# Patient Record
Sex: MALE | Race: WHITE | Employment: OTHER | URBAN - METROPOLITAN AREA
[De-identification: names, ages, dates, MRNs, and addresses within clinical notes are randomized per-mention and may not be internally consistent; named-entity substitution may affect disease eponyms.]

---

## 2023-10-02 ENCOUNTER — TELEPHONE (OUTPATIENT)
Dept: PRIMARY CARE | Facility: CLINIC | Age: 88
End: 2023-10-02
Payer: COMMERCIAL

## 2023-10-02 DIAGNOSIS — M62.81 MUSCLE WEAKNESS: Primary | ICD-10-CM

## 2023-10-02 NOTE — TELEPHONE ENCOUNTER
[facility] [phone]  PHONE NUMBER AND THE FAX NUMBER IS GOING TO [phone] IS CALLING TO SEE IF YOU CAN PUT IN AN ORDER FOR PATIENT TO GET PHYSICAL THERAPY  AND OCCUPATIONAL THERAPY DUE TO A RECENT FALL AND GENERALIZED WEAKNESS.

## 2023-10-03 NOTE — TELEPHONE ENCOUNTER
I TALKED TO ALETHA AND SHE STATED THAT SHE DID NEED THE REFERRAL SO IT WAS SENT TO HER [phone]. THANK YOU.

## 2023-10-30 DIAGNOSIS — I10 HYPERTENSION, UNSPECIFIED TYPE: Primary | ICD-10-CM

## 2023-10-30 RX ORDER — METOPROLOL SUCCINATE 25 MG/1
25 TABLET, EXTENDED RELEASE ORAL DAILY
Qty: 90 TABLET | Refills: 3 | Status: SHIPPED | OUTPATIENT
Start: 2023-10-30

## 2024-03-06 DIAGNOSIS — E03.9 HYPOTHYROIDISM, UNSPECIFIED TYPE: Primary | ICD-10-CM

## 2024-03-06 RX ORDER — LEVOTHYROXINE SODIUM 100 UG/1
100 TABLET ORAL DAILY
Qty: 90 TABLET | Refills: 3 | Status: SHIPPED | OUTPATIENT
Start: 2024-03-06

## 2024-03-11 DIAGNOSIS — N40.0 BENIGN PROSTATIC HYPERPLASIA, UNSPECIFIED WHETHER LOWER URINARY TRACT SYMPTOMS PRESENT: Primary | ICD-10-CM

## 2024-03-11 RX ORDER — FINASTERIDE 5 MG/1
5 TABLET, FILM COATED ORAL DAILY
Qty: 90 TABLET | Refills: 3 | Status: SHIPPED | OUTPATIENT
Start: 2024-03-11

## 2024-03-22 ENCOUNTER — LAB (OUTPATIENT)
Dept: LAB | Facility: LAB | Age: 89
End: 2024-03-22
Payer: COMMERCIAL

## 2024-03-22 ENCOUNTER — OFFICE VISIT (OUTPATIENT)
Dept: PRIMARY CARE | Facility: CLINIC | Age: 89
End: 2024-03-22
Payer: COMMERCIAL

## 2024-03-22 VITALS
HEIGHT: 67 IN | SYSTOLIC BLOOD PRESSURE: 108 MMHG | WEIGHT: 166 LBS | BODY MASS INDEX: 26.06 KG/M2 | DIASTOLIC BLOOD PRESSURE: 65 MMHG | HEART RATE: 70 BPM | RESPIRATION RATE: 16 BRPM

## 2024-03-22 DIAGNOSIS — G62.9 POLYNEUROPATHY: ICD-10-CM

## 2024-03-22 DIAGNOSIS — N40.1 BPH WITH OBSTRUCTION/LOWER URINARY TRACT SYMPTOMS: ICD-10-CM

## 2024-03-22 DIAGNOSIS — M19.90 OSTEOARTHRITIS, UNSPECIFIED OSTEOARTHRITIS TYPE, UNSPECIFIED SITE: ICD-10-CM

## 2024-03-22 DIAGNOSIS — C44.90 SKIN CANCER: ICD-10-CM

## 2024-03-22 DIAGNOSIS — I48.21 PERMANENT ATRIAL FIBRILLATION (MULTI): ICD-10-CM

## 2024-03-22 DIAGNOSIS — I50.22 CHRONIC SYSTOLIC HEART FAILURE (MULTI): ICD-10-CM

## 2024-03-22 DIAGNOSIS — R42 DIZZINESS: ICD-10-CM

## 2024-03-22 DIAGNOSIS — I50.9 CONGESTIVE HEART FAILURE, UNSPECIFIED HF CHRONICITY, UNSPECIFIED HEART FAILURE TYPE (MULTI): ICD-10-CM

## 2024-03-22 DIAGNOSIS — R42 VERTIGO: ICD-10-CM

## 2024-03-22 DIAGNOSIS — I35.0 AORTIC VALVE STENOSIS, ETIOLOGY OF CARDIAC VALVE DISEASE UNSPECIFIED: ICD-10-CM

## 2024-03-22 DIAGNOSIS — N42.9 PROSTATE DISEASE: ICD-10-CM

## 2024-03-22 DIAGNOSIS — H93.13 TINNITUS OF BOTH EARS: ICD-10-CM

## 2024-03-22 DIAGNOSIS — R79.89 LOW VITAMIN B12 LEVEL: ICD-10-CM

## 2024-03-22 DIAGNOSIS — R06.09 EXERTIONAL DYSPNEA: ICD-10-CM

## 2024-03-22 DIAGNOSIS — I44.2 COMPLETE ATRIOVENTRICULAR BLOCK DUE TO ATRIOVENTRICULAR NODAL ABLATION (MULTI): ICD-10-CM

## 2024-03-22 DIAGNOSIS — E03.9 HYPOTHYROIDISM, UNSPECIFIED TYPE: ICD-10-CM

## 2024-03-22 DIAGNOSIS — I48.91 ATRIAL FIBRILLATION, UNSPECIFIED TYPE (MULTI): ICD-10-CM

## 2024-03-22 DIAGNOSIS — J43.9 PULMONARY EMPHYSEMA, UNSPECIFIED EMPHYSEMA TYPE (MULTI): ICD-10-CM

## 2024-03-22 DIAGNOSIS — Z66 DNR (DO NOT RESUSCITATE): ICD-10-CM

## 2024-03-22 DIAGNOSIS — I51.9 HEART DISEASE: ICD-10-CM

## 2024-03-22 DIAGNOSIS — N13.8 BPH WITH OBSTRUCTION/LOWER URINARY TRACT SYMPTOMS: ICD-10-CM

## 2024-03-22 DIAGNOSIS — M17.12 PRIMARY OSTEOARTHRITIS OF LEFT KNEE: ICD-10-CM

## 2024-03-22 DIAGNOSIS — I97.190 COMPLETE ATRIOVENTRICULAR BLOCK DUE TO ATRIOVENTRICULAR NODAL ABLATION (MULTI): ICD-10-CM

## 2024-03-22 DIAGNOSIS — E78.5 HYPERLIPIDEMIA, UNSPECIFIED HYPERLIPIDEMIA TYPE: ICD-10-CM

## 2024-03-22 DIAGNOSIS — I50.30: ICD-10-CM

## 2024-03-22 DIAGNOSIS — Z95.0 PRESENCE OF BIVENTRICULAR CARDIAC PACEMAKER: Chronic | ICD-10-CM

## 2024-03-22 DIAGNOSIS — I42.0 DILATED CARDIOMYOPATHY (MULTI): ICD-10-CM

## 2024-03-22 DIAGNOSIS — G45.9 TIA (TRANSIENT ISCHEMIC ATTACK): ICD-10-CM

## 2024-03-22 DIAGNOSIS — R33.9 INCOMPLETE BLADDER EMPTYING: ICD-10-CM

## 2024-03-22 DIAGNOSIS — R35.1 NOCTURIA: ICD-10-CM

## 2024-03-22 DIAGNOSIS — H90.3 SENSORINEURAL HEARING LOSS, BILATERAL: ICD-10-CM

## 2024-03-22 DIAGNOSIS — E87.1 HYPONATREMIA: ICD-10-CM

## 2024-03-22 DIAGNOSIS — G47.00 INSOMNIA, UNSPECIFIED TYPE: ICD-10-CM

## 2024-03-22 DIAGNOSIS — Z00.00 MEDICARE ANNUAL WELLNESS VISIT, SUBSEQUENT: Primary | ICD-10-CM

## 2024-03-22 PROBLEM — J44.9 CHRONIC OBSTRUCTIVE PULMONARY DISEASE (MULTI): Status: ACTIVE | Noted: 2022-07-25

## 2024-03-22 LAB
ALBUMIN SERPL BCP-MCNC: 3.6 G/DL (ref 3.4–5)
ALP SERPL-CCNC: 80 U/L (ref 33–136)
ALT SERPL W P-5'-P-CCNC: 12 U/L (ref 10–52)
ANION GAP SERPL CALC-SCNC: 9 MMOL/L (ref 10–20)
AST SERPL W P-5'-P-CCNC: 23 U/L (ref 9–39)
BASOPHILS # BLD AUTO: 0.02 X10*3/UL (ref 0–0.1)
BASOPHILS NFR BLD AUTO: 0.3 %
BILIRUB SERPL-MCNC: 0.7 MG/DL (ref 0–1.2)
BUN SERPL-MCNC: 32 MG/DL (ref 6–23)
CALCIUM SERPL-MCNC: 8.7 MG/DL (ref 8.6–10.3)
CHLORIDE SERPL-SCNC: 104 MMOL/L (ref 98–107)
CHOLEST SERPL-MCNC: 137 MG/DL (ref 0–199)
CHOLESTEROL/HDL RATIO: 2.6
CO2 SERPL-SCNC: 27 MMOL/L (ref 21–32)
CREAT SERPL-MCNC: 1.04 MG/DL (ref 0.5–1.3)
EGFRCR SERPLBLD CKD-EPI 2021: 65 ML/MIN/1.73M*2
EOSINOPHIL # BLD AUTO: 0.11 X10*3/UL (ref 0–0.4)
EOSINOPHIL NFR BLD AUTO: 1.4 %
ERYTHROCYTE [DISTWIDTH] IN BLOOD BY AUTOMATED COUNT: 14.2 % (ref 11.5–14.5)
EST. AVERAGE GLUCOSE BLD GHB EST-MCNC: 128 MG/DL
GLUCOSE SERPL-MCNC: 49 MG/DL (ref 74–99)
HBA1C MFR BLD: 6.1 %
HCT VFR BLD AUTO: 41.8 % (ref 41–52)
HDLC SERPL-MCNC: 52.1 MG/DL
HGB BLD-MCNC: 13.2 G/DL (ref 13.5–17.5)
IMM GRANULOCYTES # BLD AUTO: 0.03 X10*3/UL (ref 0–0.5)
IMM GRANULOCYTES NFR BLD AUTO: 0.4 % (ref 0–0.9)
LDLC SERPL CALC-MCNC: 69 MG/DL
LYMPHOCYTES # BLD AUTO: 2.93 X10*3/UL (ref 0.8–3)
LYMPHOCYTES NFR BLD AUTO: 37.6 %
MCH RBC QN AUTO: 32.2 PG (ref 26–34)
MCHC RBC AUTO-ENTMCNC: 31.6 G/DL (ref 32–36)
MCV RBC AUTO: 102 FL (ref 80–100)
MONOCYTES # BLD AUTO: 0.86 X10*3/UL (ref 0.05–0.8)
MONOCYTES NFR BLD AUTO: 11 %
NEUTROPHILS # BLD AUTO: 3.84 X10*3/UL (ref 1.6–5.5)
NEUTROPHILS NFR BLD AUTO: 49.3 %
NON HDL CHOLESTEROL: 85 MG/DL (ref 0–149)
NRBC BLD-RTO: 0 /100 WBCS (ref 0–0)
PLATELET # BLD AUTO: 183 X10*3/UL (ref 150–450)
POTASSIUM SERPL-SCNC: 4.2 MMOL/L (ref 3.5–5.3)
PROT SERPL-MCNC: 6.6 G/DL (ref 6.4–8.2)
RBC # BLD AUTO: 4.1 X10*6/UL (ref 4.5–5.9)
SODIUM SERPL-SCNC: 136 MMOL/L (ref 136–145)
TRIGL SERPL-MCNC: 81 MG/DL (ref 0–149)
TSH SERPL-ACNC: 0.69 MIU/L (ref 0.44–3.98)
VIT B12 SERPL-MCNC: 3222 PG/ML (ref 211–911)
VLDL: 16 MG/DL (ref 0–40)
WBC # BLD AUTO: 7.8 X10*3/UL (ref 4.4–11.3)

## 2024-03-22 PROCEDURE — 1036F TOBACCO NON-USER: CPT | Performed by: FAMILY MEDICINE

## 2024-03-22 PROCEDURE — G0439 PPPS, SUBSEQ VISIT: HCPCS | Performed by: FAMILY MEDICINE

## 2024-03-22 PROCEDURE — 80061 LIPID PANEL: CPT

## 2024-03-22 PROCEDURE — 1170F FXNL STATUS ASSESSED: CPT | Performed by: FAMILY MEDICINE

## 2024-03-22 PROCEDURE — 84443 ASSAY THYROID STIM HORMONE: CPT

## 2024-03-22 PROCEDURE — 99406 BEHAV CHNG SMOKING 3-10 MIN: CPT | Performed by: FAMILY MEDICINE

## 2024-03-22 PROCEDURE — 99213 OFFICE O/P EST LOW 20 MIN: CPT | Performed by: FAMILY MEDICINE

## 2024-03-22 PROCEDURE — 1159F MED LIST DOCD IN RCRD: CPT | Performed by: FAMILY MEDICINE

## 2024-03-22 PROCEDURE — 80053 COMPREHEN METABOLIC PANEL: CPT

## 2024-03-22 PROCEDURE — 82607 VITAMIN B-12: CPT

## 2024-03-22 PROCEDURE — G0444 DEPRESSION SCREEN ANNUAL: HCPCS | Performed by: FAMILY MEDICINE

## 2024-03-22 PROCEDURE — 83036 HEMOGLOBIN GLYCOSYLATED A1C: CPT

## 2024-03-22 PROCEDURE — 36415 COLL VENOUS BLD VENIPUNCTURE: CPT

## 2024-03-22 PROCEDURE — 85025 COMPLETE CBC W/AUTO DIFF WBC: CPT

## 2024-03-22 RX ORDER — TAMSULOSIN HYDROCHLORIDE 0.4 MG/1
0.4 CAPSULE ORAL DAILY
COMMUNITY

## 2024-03-22 RX ORDER — VIT C/E/ZN/COPPR/LUTEIN/ZEAXAN 60 MG-6 MG
CAPSULE ORAL
COMMUNITY

## 2024-03-22 RX ORDER — FUROSEMIDE 40 MG/1
40 TABLET ORAL
COMMUNITY
Start: 2017-01-22

## 2024-03-22 RX ORDER — SPIRONOLACTONE 25 MG/1
25 TABLET ORAL
COMMUNITY
Start: 2022-04-06

## 2024-03-22 ASSESSMENT — ACTIVITIES OF DAILY LIVING (ADL)
BATHING: INDEPENDENT
DRESSING: INDEPENDENT
MANAGING_FINANCES: INDEPENDENT
DOING_HOUSEWORK: INDEPENDENT
TAKING_MEDICATION: INDEPENDENT
GROCERY_SHOPPING: TOTAL CARE

## 2024-03-22 ASSESSMENT — PATIENT HEALTH QUESTIONNAIRE - PHQ9
2. FEELING DOWN, DEPRESSED OR HOPELESS: SEVERAL DAYS
10. IF YOU CHECKED OFF ANY PROBLEMS, HOW DIFFICULT HAVE THESE PROBLEMS MADE IT FOR YOU TO DO YOUR WORK, TAKE CARE OF THINGS AT HOME, OR GET ALONG WITH OTHER PEOPLE: NOT DIFFICULT AT ALL
1. LITTLE INTEREST OR PLEASURE IN DOING THINGS: SEVERAL DAYS
SUM OF ALL RESPONSES TO PHQ9 QUESTIONS 1 AND 2: 2

## 2024-03-22 ASSESSMENT — ENCOUNTER SYMPTOMS
LOSS OF SENSATION IN FEET: 1
OCCASIONAL FEELINGS OF UNSTEADINESS: 0
DEPRESSION: 1

## 2024-03-22 NOTE — PROGRESS NOTES
Subjective   Patient ID: Aaron Coto is a 89 y.o. male who presents for Medicare Annual Wellness Visit Subsequent (BILATERAL FEET NUMBNESS, DOESN'T SLEEP WELL ).  HPIPatient presenting for AWV   He has hx of afib , CHF, BPH , arthritis , vertigo.     He saw neurologist , MRI reassuring   Recently saw his electrophysiologist , every thing is stable.     Still in the independent living.     Uses the electric scooter.     No recent event    Occasional vertigo .       Gets to bed 11:00 to 12:00 doesn't fall asleep till 5:00am   Does not wake up until 9:00 to 9:30           patient with history of CHF EF 25% , recent hospital for exacerbation.   Afib not on anticoagulation , following with cardiolgoy , looking to establish care with Dr. Lucas.   BPH , recently had feliciano placed and removed after hospilization , following with urology   here with his daughter in law for PE   no chest , no SOB     recently got a scooter and has been helping transport     feeling well overall     He has 6 children 11 grand children and 6 great grand children.       Review of Systems    Past Medical History:   Diagnosis Date    Chronic atrial fibrillation, unspecified (CMS/HCC)     Chronic atrial fibrillation    Chronic diastolic (congestive) heart failure (CMS/HCC) 03/18/2021    Chronic diastolic congestive heart failure    Presence of cardiac pacemaker     Cardiac pacemaker       History reviewed. No pertinent surgical history.   Social History     Socioeconomic History    Marital status:      Spouse name: None    Number of children: None    Years of education: None    Highest education level: None   Occupational History    None   Tobacco Use    Smoking status: Never    Smokeless tobacco: Never    Tobacco comments:     QUIT IN 1966   Substance and Sexual Activity    Alcohol use: Yes     Alcohol/week: 1.0 standard drink of alcohol     Types: 1 Glasses of wine per week    Drug use: Never    Sexual activity: None   Other Topics  "Concern    None   Social History Narrative    None     Social Determinants of Health     Financial Resource Strain: Not on file   Food Insecurity: Not on file   Transportation Needs: Not on file   Physical Activity: Not on file   Stress: Not on file   Social Connections: Not on file   Intimate Partner Violence: Not on file   Housing Stability: Not on file      Family History   Problem Relation Name Age of Onset    Liver cancer Father         MEDICATIONS AND ALLERGIES:    ALLERGIES Cat hair standardized allergenic extract, Sacubitril-valsartan, and Sulfa (sulfonamide antibiotics)    MEDICATIONS   Current Outpatient Medications on File Prior to Visit   Medication Sig Dispense Refill    furosemide (Lasix) 40 mg tablet Take 1 tablet (40 mg) by mouth once daily.      finasteride (Proscar) 5 mg tablet TAKE 1 TABLET DAILY 90 tablet 3    levothyroxine (Synthroid, Levoxyl) 100 mcg tablet TAKE 1 TABLET DAILY 90 tablet 3    metoprolol succinate XL (Toprol-XL) 25 mg 24 hr tablet TAKE 1 TABLET DAILY 90 tablet 3    tamsulosin (Flomax) 0.4 mg 24 hr capsule Take 1 capsule (0.4 mg) by mouth once daily.       No current facility-administered medications on file prior to visit.        Objective   Visit Vitals  /65   Pulse 70   Resp 16   Ht 1.702 m (5' 7\")   Wt 75.3 kg (166 lb)   BMI 26.00 kg/m²   Smoking Status Never   BSA 1.89 m²      Physical Exam  Constitutional:       Appearance: Normal appearance.   HENT:      Head: Normocephalic and atraumatic.   Eyes:      Pupils: Pupils are equal, round, and reactive to light.   Cardiovascular:      Rate and Rhythm: Normal rate.   Pulmonary:      Effort: Pulmonary effort is normal.   Musculoskeletal:         General: No swelling.      Cervical back: Normal range of motion.   Skin:     Coloration: Skin is not jaundiced.   Neurological:      General: No focal deficit present.      Mental Status: He is alert and oriented to person, place, and time.       Ears clear   Heart has holosystolic " murmur   Abdomen soft   No leg swelling   Lungs clear.       1. Medicare annual wellness visit, subsequent  Risk Factors Identified During Visit: turing 89 , CHF  Influenza:  advised  Pneumovax 23: up-to-date  Prevnar: up-to-date  Shingles Vaccine: completed   Prostate cancer screening:  not indicated due to age   Colorectal Cancer Screening: not indicated due to age  Code status :  DNR     2. Hypothyroidism, unspecified type  Will recheck levels   - Urine Culture; Future  - TSH with reflex to Free T4 if abnormal; Future  - CBC and Auto Differential; Future  - Comprehensive Metabolic Panel; Future  - Hemoglobin A1C; Future  - Lipid Panel; Future  - Urinalysis with Reflex Microscopic; Future  - Vitamin B12; Future    3. Polyneuropathy  Will check vitamin B12   Did not want nerve conduction study   - Urine Culture; Future  - TSH with reflex to Free T4 if abnormal; Future  - CBC and Auto Differential; Future  - Comprehensive Metabolic Panel; Future  - Hemoglobin A1C; Future  - Lipid Panel; Future  - Urinalysis with Reflex Microscopic; Future  - Vitamin B12; Future    4. Atrial fibrillation, unspecified type (CMS/HCC)  Following with electrophysiologist , tolerating treatment   - Urine Culture; Future  - TSH with reflex to Free T4 if abnormal; Future  - CBC and Auto Differential; Future  - Comprehensive Metabolic Panel; Future  - Hemoglobin A1C; Future  - Lipid Panel; Future  - Urinalysis with Reflex Microscopic; Future  - Vitamin B12; Future    5. Vertigo  Stable   - Urine Culture; Future  - TSH with reflex to Free T4 if abnormal; Future  - CBC and Auto Differential; Future  - Comprehensive Metabolic Panel; Future  - Hemoglobin A1C; Future  - Lipid Panel; Future  - Urinalysis with Reflex Microscopic; Future  - Vitamin B12; Future    6. Congestive heart failure, unspecified HF chronicity, unspecified heart failure type (CMS/HCC)  Following with cardiology   No acute complaints.   - Urine Culture; Future  - TSH with  reflex to Free T4 if abnormal; Future  - CBC and Auto Differential; Future  - Comprehensive Metabolic Panel; Future  - Hemoglobin A1C; Future  - Lipid Panel; Future  - Urinalysis with Reflex Microscopic; Future  - Vitamin B12; Future    7. Low vitamin B12 level  Will recheck levels  - Urine Culture; Future  - TSH with reflex to Free T4 if abnormal; Future  - CBC and Auto Differential; Future  - Comprehensive Metabolic Panel; Future  - Hemoglobin A1C; Future  - Lipid Panel; Future  - Urinalysis with Reflex Microscopic; Future  - Vitamin B12; Future    8. Insomnia, unspecified type  He sleeps goes to bed at 12 , then wakes up and read a book or does puzles , then goes to bed and wake up at around 11:00   Advised to set alarm to wake up earlier   And see if that helps   Because preveously medication did not help with symptoms.   - Urine Culture; Future  - TSH with reflex to Free T4 if abnormal; Future  - CBC and Auto Differential; Future  - Comprehensive Metabolic Panel; Future  - Hemoglobin A1C; Future  - Lipid Panel; Future  - Urinalysis with Reflex Microscopic; Future  - Vitamin B12; Future    9. Hyperlipidemia, unspecified hyperlipidemia type  Jared rehcek   - Urine Culture; Future  - TSH with reflex to Free T4 if abnormal; Future  - CBC and Auto Differential; Future  - Comprehensive Metabolic Panel; Future  - Hemoglobin A1C; Future  - Lipid Panel; Future  - Urinalysis with Reflex Microscopic; Future  - Vitamin B12; Future    10. Presence of biventricular cardiac pacemaker  Following with caridology   - Urine Culture; Future  - TSH with reflex to Free T4 if abnormal; Future  - CBC and Auto Differential; Future  - Comprehensive Metabolic Panel; Future  - Hemoglobin A1C; Future  - Lipid Panel; Future  - Urinalysis with Reflex Microscopic; Future  - Vitamin B12; Future    11. Aortic valve stenosis, etiology of cardiac valve disease unspecified  Stable   - Urine Culture; Future  - TSH with reflex to Free T4 if abnormal;  Future  - CBC and Auto Differential; Future  - Comprehensive Metabolic Panel; Future  - Hemoglobin A1C; Future  - Lipid Panel; Future  - Urinalysis with Reflex Microscopic; Future  - Vitamin B12; Future    12. BPH with obstruction/lower urinary tract symptoms  No acute coplaints.   - Urine Culture; Future  - TSH with reflex to Free T4 if abnormal; Future  - CBC and Auto Differential; Future  - Comprehensive Metabolic Panel; Future  - Hemoglobin A1C; Future  - Lipid Panel; Future  - Urinalysis with Reflex Microscopic; Future  - Vitamin B12; Future    13. Permanent atrial fibrillation (CMS/HCC)  Stable   Vitals in the normal range.   - Urine Culture; Future  - TSH with reflex to Free T4 if abnormal; Future  - CBC and Auto Differential; Future  - Comprehensive Metabolic Panel; Future  - Hemoglobin A1C; Future  - Lipid Panel; Future  - Urinalysis with Reflex Microscopic; Future  - Vitamin B12; Future    14. Chronic systolic heart failure (CMS/[facility])  Stable   We dsicussed alrming symptoms of decompensation   - Urine Culture; Future  - TSH with reflex to Free T4 if abnormal; Future  - CBC and Auto Differential; Future  - Comprehensive Metabolic Panel; Future  - Hemoglobin A1C; Future  - Lipid Panel; Future  - Urinalysis with Reflex Microscopic; Future  - Vitamin B12; Future    15. Pulmonary emphysema, unspecified emphysema type (CMS/[facility])  No acute complaints.   - Urine Culture; Future  - TSH with reflex to Free T4 if abnormal; Future  - CBC and Auto Differential; Future  - Comprehensive Metabolic Panel; Future  - Hemoglobin A1C; Future  - Lipid Panel; Future  - Urinalysis with Reflex Microscopic; Future  - Vitamin B12; Future    16. Complete atrioventricular block due to atrioventricular eveline ablation (CMS/[facility])  Followign with caridology   - Urine Culture; Future  - TSH with reflex to Free T4 if abnormal; Future  - CBC and Auto Differential; Future  - Comprehensive Metabolic Panel; Future  - Hemoglobin A1C; Future  -  Lipid Panel; Future  - Urinalysis with Reflex Microscopic; Future  - Vitamin B12; Future    17. Congestive heart failure with LV diastolic dysfunction, NYHA class 2 (CMS/HCC)    - Urine Culture; Future  - TSH with reflex to Free T4 if abnormal; Future  - CBC and Auto Differential; Future  - Comprehensive Metabolic Panel; Future  - Hemoglobin A1C; Future  - Lipid Panel; Future  - Urinalysis with Reflex Microscopic; Future  - Vitamin B12; Future    18. Osteoarthritis, unspecified osteoarthritis type, unspecified site  Fall precuations dicussed.   - Urine Culture; Future  - TSH with reflex to Free T4 if abnormal; Future  - CBC and Auto Differential; Future  - Comprehensive Metabolic Panel; Future  - Hemoglobin A1C; Future  - Lipid Panel; Future  - Urinalysis with Reflex Microscopic; Future  - Vitamin B12; Future    19. Dilated cardiomyopathy (CMS/[facility])  Following with cardiology   - Urine Culture; Future  - TSH with reflex to Free T4 if abnormal; Future  - CBC and Auto Differential; Future  - Comprehensive Metabolic Panel; Future  - Hemoglobin A1C; Future  - Lipid Panel; Future  - Urinalysis with Reflex Microscopic; Future  - Vitamin B12; Future    20. Dizziness  Stable   - Urine Culture; Future  - TSH with reflex to Free T4 if abnormal; Future  - CBC and Auto Differential; Future  - Comprehensive Metabolic Panel; Future  - Hemoglobin A1C; Future  - Lipid Panel; Future  - Urinalysis with Reflex Microscopic; Future  - Vitamin B12; Future    21. Exertional dyspnea  Stable   - Urine Culture; Future  - TSH with reflex to Free T4 if abnormal; Future  - CBC and Auto Differential; Future  - Comprehensive Metabolic Panel; Future  - Hemoglobin A1C; Future  - Lipid Panel; Future  - Urinalysis with Reflex Microscopic; Future  - Vitamin B12; Future    22. Heart disease    - Urine Culture; Future  - TSH with reflex to Free T4 if abnormal; Future  - CBC and Auto Differential; Future  - Comprehensive Metabolic Panel; Future  -  Hemoglobin A1C; Future  - Lipid Panel; Future  - Urinalysis with Reflex Microscopic; Future  - Vitamin B12; Future    23. Hyponatremia  Will recheck levels   - Urine Culture; Future  - TSH with reflex to Free T4 if abnormal; Future  - CBC and Auto Differential; Future  - Comprehensive Metabolic Panel; Future  - Hemoglobin A1C; Future  - Lipid Panel; Future  - Urinalysis with Reflex Microscopic; Future  - Vitamin B12; Future    24. Incomplete bladder emptying  No acute complaints, no symptoms of infection   - Urine Culture; Future  - TSH with reflex to Free T4 if abnormal; Future  - CBC and Auto Differential; Future  - Comprehensive Metabolic Panel; Future  - Hemoglobin A1C; Future  - Lipid Panel; Future  - Urinalysis with Reflex Microscopic; Future  - Vitamin B12; Future    25. Nocturia  Stable   - Urine Culture; Future  - TSH with reflex to Free T4 if abnormal; Future  - CBC and Auto Differential; Future  - Comprehensive Metabolic Panel; Future  - Hemoglobin A1C; Future  - Lipid Panel; Future  - Urinalysis with Reflex Microscopic; Future  - Vitamin B12; Future    26. Primary osteoarthritis of left knee    - Urine Culture; Future  - TSH with reflex to Free T4 if abnormal; Future  - CBC and Auto Differential; Future  - Comprehensive Metabolic Panel; Future  - Hemoglobin A1C; Future  - Lipid Panel; Future  - Urinalysis with Reflex Microscopic; Future  - Vitamin B12; Future    27. Prostate disease    - Urine Culture; Future  - TSH with reflex to Free T4 if abnormal; Future  - CBC and Auto Differential; Future  - Comprehensive Metabolic Panel; Future  - Hemoglobin A1C; Future  - Lipid Panel; Future  - Urinalysis with Reflex Microscopic; Future  - Vitamin B12; Future    28. Sensorineural hearing loss, bilateral  Hearing aid helpos   - Urine Culture; Future  - TSH with reflex to Free T4 if abnormal; Future  - CBC and Auto Differential; Future  - Comprehensive Metabolic Panel; Future  - Hemoglobin A1C; Future  - Lipid  Panel; Future  - Urinalysis with Reflex Microscopic; Future  - Vitamin B12; Future    29. Skin cancer  Follows with dermaotlogy   Recently had SCC removed.   - Urine Culture; Future  - TSH with reflex to Free T4 if abnormal; Future  - CBC and Auto Differential; Future  - Comprehensive Metabolic Panel; Future  - Hemoglobin A1C; Future  - Lipid Panel; Future  - Urinalysis with Reflex Microscopic; Future  - Vitamin B12; Future    30. TIA (transient ischemic attack)  No acute events.   - Urine Culture; Future  - TSH with reflex to Free T4 if abnormal; Future  - CBC and Auto Differential; Future  - Comprehensive Metabolic Panel; Future  - Hemoglobin A1C; Future  - Lipid Panel; Future  - Urinalysis with Reflex Microscopic; Future  - Vitamin B12; Future    31. Tinnitus of both ears    - Urine Culture; Future  - TSH with reflex to Free T4 if abnormal; Future  - CBC and Auto Differential; Future  - Comprehensive Metabolic Panel; Future  - Hemoglobin A1C; Future  - Lipid Panel; Future  - Urinalysis with Reflex Microscopic; Future  - Vitamin B12; Future    32. DNR (do not resuscitate)  After prolonged discussion about code status , patient rpeorted that he wants to be DNR   Emergency contacts in chart.   - DNR    Offerred PT for fall risk     Did not want medication changes for depression   No suicidal or homicidal ideaitom

## 2024-03-27 ENCOUNTER — TELEPHONE (OUTPATIENT)
Dept: PRIMARY CARE | Facility: CLINIC | Age: 89
End: 2024-03-27
Payer: COMMERCIAL

## 2024-03-27 NOTE — TELEPHONE ENCOUNTER
Her fasting blood sugar 107 and hemoglobin A1c 6 1 so advised to watch diet for sugar and carbs intake  She was advised she is considered as a prediabetic  Her   has a diabetes mellitus and they know about diet  Patient advised.

## 2024-03-27 NOTE — TELEPHONE ENCOUNTER
----- Message from Jian Ramos MD sent at 3/27/2024  8:20 AM EDT -----  Vitamin B12 is high , if anything cut down the dose to 3 times a week,   Sugar was low that day , this can be related in delay in processing the sample at the lab , can he check sugar level at his indipedent living , can he ask a nurse to do that once or twice just to make sure it is normal.   Otherwise labs stable. Make sure he is staying hydrated

## 2024-03-29 ENCOUNTER — APPOINTMENT (OUTPATIENT)
Dept: PRIMARY CARE | Facility: CLINIC | Age: 89
End: 2024-03-29
Payer: COMMERCIAL

## 2024-06-21 ENCOUNTER — OFFICE VISIT (OUTPATIENT)
Dept: PRIMARY CARE | Facility: CLINIC | Age: 89
End: 2024-06-21
Payer: COMMERCIAL

## 2024-06-21 ENCOUNTER — LAB (OUTPATIENT)
Dept: LAB | Facility: LAB | Age: 89
End: 2024-06-21
Payer: COMMERCIAL

## 2024-06-21 VITALS
SYSTOLIC BLOOD PRESSURE: 118 MMHG | HEART RATE: 64 BPM | TEMPERATURE: 97 F | RESPIRATION RATE: 18 BRPM | OXYGEN SATURATION: 96 % | DIASTOLIC BLOOD PRESSURE: 70 MMHG | BODY MASS INDEX: 26.78 KG/M2 | WEIGHT: 171 LBS

## 2024-06-21 DIAGNOSIS — I50.9 DECOMPENSATED HEART FAILURE (MULTI): ICD-10-CM

## 2024-06-21 DIAGNOSIS — R60.0 LOWER LEG EDEMA: ICD-10-CM

## 2024-06-21 DIAGNOSIS — R60.0 LOWER LEG EDEMA: Primary | ICD-10-CM

## 2024-06-21 LAB
ALBUMIN SERPL BCP-MCNC: 3.6 G/DL (ref 3.4–5)
ALP SERPL-CCNC: 84 U/L (ref 33–136)
ALT SERPL W P-5'-P-CCNC: 16 U/L (ref 10–52)
ANION GAP SERPL CALC-SCNC: 11 MMOL/L (ref 10–20)
AST SERPL W P-5'-P-CCNC: 25 U/L (ref 9–39)
BASOPHILS # BLD AUTO: 0.02 X10*3/UL (ref 0–0.1)
BASOPHILS NFR BLD AUTO: 0.3 %
BILIRUB SERPL-MCNC: 0.9 MG/DL (ref 0–1.2)
BNP SERPL-MCNC: 493 PG/ML (ref 0–99)
BUN SERPL-MCNC: 32 MG/DL (ref 6–23)
CALCIUM SERPL-MCNC: 8.7 MG/DL (ref 8.6–10.3)
CHLORIDE SERPL-SCNC: 99 MMOL/L (ref 98–107)
CO2 SERPL-SCNC: 27 MMOL/L (ref 21–32)
CREAT SERPL-MCNC: 1.02 MG/DL (ref 0.5–1.3)
EGFRCR SERPLBLD CKD-EPI 2021: 66 ML/MIN/1.73M*2
EOSINOPHIL # BLD AUTO: 0.14 X10*3/UL (ref 0–0.4)
EOSINOPHIL NFR BLD AUTO: 1.8 %
ERYTHROCYTE [DISTWIDTH] IN BLOOD BY AUTOMATED COUNT: 14.3 % (ref 11.5–14.5)
GLUCOSE SERPL-MCNC: 113 MG/DL (ref 74–99)
HCT VFR BLD AUTO: 39.3 % (ref 41–52)
HGB BLD-MCNC: 13 G/DL (ref 13.5–17.5)
IMM GRANULOCYTES # BLD AUTO: 0.02 X10*3/UL (ref 0–0.5)
IMM GRANULOCYTES NFR BLD AUTO: 0.3 % (ref 0–0.9)
LYMPHOCYTES # BLD AUTO: 3.28 X10*3/UL (ref 0.8–3)
LYMPHOCYTES NFR BLD AUTO: 42.9 %
MCH RBC QN AUTO: 32.8 PG (ref 26–34)
MCHC RBC AUTO-ENTMCNC: 33.1 G/DL (ref 32–36)
MCV RBC AUTO: 99 FL (ref 80–100)
MONOCYTES # BLD AUTO: 0.74 X10*3/UL (ref 0.05–0.8)
MONOCYTES NFR BLD AUTO: 9.7 %
NEUTROPHILS # BLD AUTO: 3.45 X10*3/UL (ref 1.6–5.5)
NEUTROPHILS NFR BLD AUTO: 45 %
NRBC BLD-RTO: 0 /100 WBCS (ref 0–0)
PLATELET # BLD AUTO: 194 X10*3/UL (ref 150–450)
POTASSIUM SERPL-SCNC: 4.3 MMOL/L (ref 3.5–5.3)
PROT SERPL-MCNC: 6.6 G/DL (ref 6.4–8.2)
RBC # BLD AUTO: 3.96 X10*6/UL (ref 4.5–5.9)
SODIUM SERPL-SCNC: 133 MMOL/L (ref 136–145)
WBC # BLD AUTO: 7.7 X10*3/UL (ref 4.4–11.3)

## 2024-06-21 PROCEDURE — 85025 COMPLETE CBC W/AUTO DIFF WBC: CPT

## 2024-06-21 PROCEDURE — 80053 COMPREHEN METABOLIC PANEL: CPT

## 2024-06-21 PROCEDURE — 83880 ASSAY OF NATRIURETIC PEPTIDE: CPT

## 2024-06-21 PROCEDURE — 36415 COLL VENOUS BLD VENIPUNCTURE: CPT

## 2024-06-21 PROCEDURE — 99214 OFFICE O/P EST MOD 30 MIN: CPT | Performed by: FAMILY MEDICINE

## 2024-06-21 NOTE — PROGRESS NOTES
Subjective   Patient ID: Aaron Coto is a 89 y.o. male who presents for Leg Swelling (BILATERAL LEG SWELLING X10 DAYS ).  HPI  Patient with hx of CHF , EF 20%  Presenting with 10 days of leg swelling , no pain   He is compliant with his meds   He was eating soup which could have more salt then he is used to.   Sligth shorntness of breath   No chest pain   No leag pain   He is on xarelto.       Review of Systems    Past Medical History:   Diagnosis Date    Chronic atrial fibrillation, unspecified (Multi)     Chronic atrial fibrillation    Chronic diastolic (congestive) heart failure (Multi) 03/18/2021    Chronic diastolic congestive heart failure    Presence of cardiac pacemaker     Cardiac pacemaker       No past surgical history on file.   Social History     Socioeconomic History    Marital status:      Spouse name: Not on file    Number of children: Not on file    Years of education: Not on file    Highest education level: Not on file   Occupational History    Not on file   Tobacco Use    Smoking status: Never    Smokeless tobacco: Never    Tobacco comments:     QUIT IN 1966   Substance and Sexual Activity    Alcohol use: Yes     Alcohol/week: 1.0 standard drink of alcohol     Types: 1 Glasses of wine per week    Drug use: Never    Sexual activity: Not on file   Other Topics Concern    Not on file   Social History Narrative    Not on file     Social Determinants of Health     Financial Resource Strain: Low Risk  (9/26/2023)    Received from [facility]    Overall Financial Resource Strain (CARDIA)     Difficulty of Paying Living Expenses: Not hard at all   Food Insecurity: No Food Insecurity (9/26/2023)    Received from [facility]    Hunger Vital Sign     Worried About Running Out of Food in the Last Year: Never true     Ran Out of Food in the Last Year: Never true   Transportation Needs: No Transportation Needs (9/26/2023)    Received from [facility]    PRAPARE - Transportation      Lack of Transportation (Medical): No     Lack of Transportation (Non-Medical): No   Physical Activity: Not on file   Stress: Not on file   Social Connections: Not on file   Intimate Partner Violence: Not on file   Housing Stability: Low Risk  (9/26/2023)    Received from [facility]    Housing Stability Vital Sign     Unable to Pay for Housing in the Last Year: No     Number of Places Lived in the Last Year: 1     Unstable Housing in the Last Year: No      Family History   Problem Relation Name Age of Onset    Liver cancer Father         MEDICATIONS AND ALLERGIES:    ALLERGIES Cat hair standardized allergenic extract, Sacubitril-valsartan, and Sulfa (sulfonamide antibiotics)    MEDICATIONS   Current Outpatient Medications on File Prior to Visit   Medication Sig Dispense Refill    finasteride (Proscar) 5 mg tablet TAKE 1 TABLET DAILY 90 tablet 3    furosemide (Lasix) 40 mg tablet Take 1 tablet (40 mg) by mouth once daily.      levothyroxine (Synthroid, Levoxyl) 100 mcg tablet TAKE 1 TABLET DAILY 90 tablet 3    metoprolol succinate XL (Toprol-XL) 25 mg 24 hr tablet TAKE 1 TABLET DAILY 90 tablet 3    rivaroxaban (Xarelto) 15 mg tablet Take 1 tablet (15 mg) by mouth once daily.      spironolactone (Aldactone) 25 mg tablet Take 1 tablet (25 mg) by mouth once daily.      tamsulosin (Flomax) 0.4 mg 24 hr capsule Take 1 capsule (0.4 mg) by mouth once daily.      vit C,U-Px-jtndm-lutein-zeaxan (Ocuvite Lutein and Zeaxanthin) 60 mg-13.5 mg- 15 mg-2 mg-6 mg capsule Take by mouth.       No current facility-administered medications on file prior to visit.              Objective   Visit Vitals  /70   Pulse 64   Temp 36.1 °C (97 °F) (Temporal)   Resp 18   Wt 77.6 kg (171 lb)   SpO2 96%   BMI 26.78 kg/m²   Smoking Status Never   BSA 1.92 m²          3/22/2024    10:43 AM 6/21/2024    11:38 AM   Vitals   Systolic 108 118   Diastolic 65 70   Heart Rate 70 64   Temp  36.1 °C (97 °F)   Resp 16 18   Height (in) 1.702 m (5'  "7\")    Weight (lb) 166 171   BMI 26 kg/m2 26.78 kg/m2   BSA (m2) 1.89 m2 1.92 m2   Visit Report Report Report     Physical Exam  Lungs clear   Heart rate normal, heart murmur heard   Bilatearl leg swelling below knee 4+     1. Lower leg edema  Will check labs below   Will increase his lasix to 40mg BID For 3 days , contact us with result   Water restriction less than 2 L   Cut down salt intake   Advised that he contact his cardiologist.   If symptoms got worse I advseid that he goes         - CBC and Auto Differential; Future  - Comprehensive metabolic panel; Future  - B-type natriuretic peptide; Future    2. Decompensated heart failure (Multi)  Will check BNP   Advised to contact his cardiology team   - B-type natriuretic peptide; Future                 "

## 2024-06-26 ENCOUNTER — TELEPHONE (OUTPATIENT)
Dept: PRIMARY CARE | Facility: CLINIC | Age: 89
End: 2024-06-26
Payer: COMMERCIAL

## 2024-06-26 NOTE — TELEPHONE ENCOUNTER
----- Message from Jian Ramos MD sent at 6/26/2024  1:31 PM EDT -----  Kidney function stable , heart enzyme normal for age.   Blood counts stable   I am glad to hear that his legs are getting better, call me for any change. He can double the dose of the lasix for 3 more days if needed.

## 2024-06-26 NOTE — TELEPHONE ENCOUNTER
Patient calling to let you know he has lost 6 pounds and the swelling in his legs is getting better but not completely gone yet. Will call back if any other issues

## 2024-07-31 ENCOUNTER — OFFICE VISIT (OUTPATIENT)
Dept: PRIMARY CARE | Facility: CLINIC | Age: 89
End: 2024-07-31
Payer: COMMERCIAL

## 2024-07-31 VITALS
DIASTOLIC BLOOD PRESSURE: 64 MMHG | HEART RATE: 71 BPM | SYSTOLIC BLOOD PRESSURE: 110 MMHG | OXYGEN SATURATION: 96 % | TEMPERATURE: 96.6 F

## 2024-07-31 DIAGNOSIS — L85.3 DRY SKIN DERMATITIS: ICD-10-CM

## 2024-07-31 DIAGNOSIS — S39.012A LUMBAR STRAIN, INITIAL ENCOUNTER: Primary | ICD-10-CM

## 2024-07-31 PROCEDURE — 1036F TOBACCO NON-USER: CPT | Performed by: INTERNAL MEDICINE

## 2024-07-31 PROCEDURE — 99213 OFFICE O/P EST LOW 20 MIN: CPT | Performed by: INTERNAL MEDICINE

## 2024-07-31 NOTE — PROGRESS NOTES
Addended by: STORMY REYNA on: 4/15/2024 10:21 AM     Modules accepted: Orders     Subjective   Patient ID: Aaron Coto is a 89 y.o. male who presents for Back Pain (Around waist ) and Rash (Right leg above ank/e).    He had left sided low back discomfort, described as dull ache, denies recent injury or fall, and he uses 3 leg walker.   No weakness, radiation of pain,tingling or numbness, and pain has completely resolved now. He has not tried any meds for it, so far.  He does take xarelto, so he cannot take NSAIDs.   He has dry skin and roughness , skin thickening and area of hard scab, no discharge or bleeding over his left lower leg.         Review of Systems  See Hpi      Objective   /64 (BP Location: Left arm, Patient Position: Sitting, BP Cuff Size: Large adult)   Pulse 71   Temp 35.9 °C (96.6 °F)   SpO2 96%     Physical Exam  Musculoskeletal:         General: No tenderness.      Comments: Up with assist , walker  No lumbar  / para lumbar tenderness  Normal ROM of spine    Skin:     General: Skin is warm and dry.      Comments: LE venous stasis , and dry skin with some thickening , excoriation over left lower leg  He wears knee high stocking         Assessment/Plan        Lumbar strain, resolved  LE dry skin / thickening    PAF , pacer   Chronic systolic HF    Fall precautions  Up with walker  Tylenol prn for pain, Heat for comfort  Avoid NSAID    Use Lubriderm , calamine lotion over LE dry skin  HC cream on more rough thickened areas   Leg elevation, skin care

## 2024-09-23 ENCOUNTER — APPOINTMENT (OUTPATIENT)
Dept: PRIMARY CARE | Facility: CLINIC | Age: 89
End: 2024-09-23
Payer: COMMERCIAL

## 2024-09-25 ENCOUNTER — OFFICE VISIT (OUTPATIENT)
Dept: PRIMARY CARE | Facility: CLINIC | Age: 89
End: 2024-09-25
Payer: COMMERCIAL

## 2024-09-25 ENCOUNTER — LAB (OUTPATIENT)
Dept: LAB | Facility: LAB | Age: 89
End: 2024-09-25
Payer: COMMERCIAL

## 2024-09-25 VITALS
TEMPERATURE: 97 F | RESPIRATION RATE: 16 BRPM | HEART RATE: 74 BPM | BODY MASS INDEX: 26.94 KG/M2 | SYSTOLIC BLOOD PRESSURE: 100 MMHG | WEIGHT: 172 LBS | DIASTOLIC BLOOD PRESSURE: 60 MMHG

## 2024-09-25 DIAGNOSIS — Z23 NEEDS FLU SHOT: ICD-10-CM

## 2024-09-25 DIAGNOSIS — L03.115 CELLULITIS OF RIGHT LOWER EXTREMITY: ICD-10-CM

## 2024-09-25 DIAGNOSIS — L03.115 CELLULITIS OF RIGHT LOWER EXTREMITY: Primary | ICD-10-CM

## 2024-09-25 LAB
ANION GAP SERPL CALC-SCNC: 10 MMOL/L (ref 10–20)
BASOPHILS # BLD AUTO: 0.02 X10*3/UL (ref 0–0.1)
BASOPHILS NFR BLD AUTO: 0.2 %
BUN SERPL-MCNC: 25 MG/DL (ref 6–23)
CALCIUM SERPL-MCNC: 8.5 MG/DL (ref 8.6–10.3)
CHLORIDE SERPL-SCNC: 99 MMOL/L (ref 98–107)
CO2 SERPL-SCNC: 29 MMOL/L (ref 21–32)
CREAT SERPL-MCNC: 1.25 MG/DL (ref 0.5–1.3)
EGFRCR SERPLBLD CKD-EPI 2021: 52 ML/MIN/1.73M*2
EOSINOPHIL # BLD AUTO: 0.1 X10*3/UL (ref 0–0.4)
EOSINOPHIL NFR BLD AUTO: 1.2 %
ERYTHROCYTE [DISTWIDTH] IN BLOOD BY AUTOMATED COUNT: 15.1 % (ref 11.5–14.5)
GLUCOSE SERPL-MCNC: 92 MG/DL (ref 74–99)
HCT VFR BLD AUTO: 43.1 % (ref 41–52)
HGB BLD-MCNC: 13.7 G/DL (ref 13.5–17.5)
IMM GRANULOCYTES # BLD AUTO: 0.02 X10*3/UL (ref 0–0.5)
IMM GRANULOCYTES NFR BLD AUTO: 0.2 % (ref 0–0.9)
LYMPHOCYTES # BLD AUTO: 3.72 X10*3/UL (ref 0.8–3)
LYMPHOCYTES NFR BLD AUTO: 46.4 %
MCH RBC QN AUTO: 31.9 PG (ref 26–34)
MCHC RBC AUTO-ENTMCNC: 31.8 G/DL (ref 32–36)
MCV RBC AUTO: 101 FL (ref 80–100)
MONOCYTES # BLD AUTO: 0.87 X10*3/UL (ref 0.05–0.8)
MONOCYTES NFR BLD AUTO: 10.8 %
NEUTROPHILS # BLD AUTO: 3.29 X10*3/UL (ref 1.6–5.5)
NEUTROPHILS NFR BLD AUTO: 41.2 %
NRBC BLD-RTO: 0 /100 WBCS (ref 0–0)
PLATELET # BLD AUTO: 176 X10*3/UL (ref 150–450)
POTASSIUM SERPL-SCNC: 4.4 MMOL/L (ref 3.5–5.3)
RBC # BLD AUTO: 4.29 X10*6/UL (ref 4.5–5.9)
SODIUM SERPL-SCNC: 134 MMOL/L (ref 136–145)
WBC # BLD AUTO: 8 X10*3/UL (ref 4.4–11.3)

## 2024-09-25 PROCEDURE — 36415 COLL VENOUS BLD VENIPUNCTURE: CPT

## 2024-09-25 PROCEDURE — 99214 OFFICE O/P EST MOD 30 MIN: CPT | Performed by: FAMILY MEDICINE

## 2024-09-25 PROCEDURE — 85025 COMPLETE CBC W/AUTO DIFF WBC: CPT

## 2024-09-25 PROCEDURE — 80048 BASIC METABOLIC PNL TOTAL CA: CPT

## 2024-09-25 PROCEDURE — 1159F MED LIST DOCD IN RCRD: CPT | Performed by: FAMILY MEDICINE

## 2024-09-25 PROCEDURE — 90662 IIV NO PRSV INCREASED AG IM: CPT | Performed by: FAMILY MEDICINE

## 2024-09-25 PROCEDURE — 1036F TOBACCO NON-USER: CPT | Performed by: FAMILY MEDICINE

## 2024-09-25 PROCEDURE — G0008 ADMIN INFLUENZA VIRUS VAC: HCPCS | Performed by: FAMILY MEDICINE

## 2024-09-25 RX ORDER — CEPHALEXIN 250 MG/1
250 CAPSULE ORAL 2 TIMES DAILY
Qty: 14 CAPSULE | Refills: 0 | Status: SHIPPED | OUTPATIENT
Start: 2024-09-25 | End: 2024-10-02

## 2024-09-25 NOTE — PROGRESS NOTES
Subjective   Patient ID: Aaron Coto is a 89 y.o. male who presents for Follow-up (Swelling in legs ).  HPI    Lower leg edema   No shortness of breath   No signficant weight gain   Legs are erythematous and wheeping.   Review of Systems    Past Medical History:   Diagnosis Date    Chronic atrial fibrillation, unspecified (Multi)     Chronic atrial fibrillation    Chronic diastolic (congestive) heart failure (Multi) 03/18/2021    Chronic diastolic congestive heart failure    Presence of cardiac pacemaker     Cardiac pacemaker       History reviewed. No pertinent surgical history.   Social History     Socioeconomic History    Marital status:    Tobacco Use    Smoking status: Never    Smokeless tobacco: Never    Tobacco comments:     QUIT IN 1966   Substance and Sexual Activity    Alcohol use: Yes     Alcohol/week: 1.0 standard drink of alcohol     Types: 1 Glasses of wine per week    Drug use: Never     Social Determinants of Health     Financial Resource Strain: Low Risk  (9/26/2023)    Received from [facility]    Overall Financial Resource Strain (CARDIA)     Difficulty of Paying Living Expenses: Not hard at all   Food Insecurity: No Food Insecurity (9/26/2023)    Received from [facility]    Hunger Vital Sign     Worried About Running Out of Food in the Last Year: Never true     Ran Out of Food in the Last Year: Never true   Transportation Needs: No Transportation Needs (9/26/2023)    Received from [facility]    PRAPARE - Transportation     Lack of Transportation (Medical): No     Lack of Transportation (Non-Medical): No   Housing Stability: Low Risk  (9/26/2023)    Received from [facility]    Housing Stability Vital Sign     Unable to Pay for Housing in the Last Year: No     Number of Places Lived in the Last Year: 1     Unstable Housing in the Last Year: No      Family History   Problem Relation Name Age of  "Onset    Liver cancer Father         MEDICATIONS AND ALLERGIES:    ALLERGIES Cat hair standardized allergenic extract, Sacubitril-valsartan, and Sulfa (sulfonamide antibiotics)    MEDICATIONS   Current Outpatient Medications on File Prior to Visit   Medication Sig Dispense Refill    finasteride (Proscar) 5 mg tablet TAKE 1 TABLET DAILY 90 tablet 3    furosemide (Lasix) 40 mg tablet Take 1 tablet (40 mg) by mouth once daily.      levothyroxine (Synthroid, Levoxyl) 100 mcg tablet TAKE 1 TABLET DAILY 90 tablet 3    metoprolol succinate XL (Toprol-XL) 25 mg 24 hr tablet TAKE 1 TABLET DAILY 90 tablet 3    rivaroxaban (Xarelto) 15 mg tablet Take 1 tablet (15 mg) by mouth once daily.      spironolactone (Aldactone) 25 mg tablet Take 1 tablet (25 mg) by mouth once daily.      tamsulosin (Flomax) 0.4 mg 24 hr capsule Take 1 capsule (0.4 mg) by mouth once daily.      vit C,G-Ap-faeip-lutein-zeaxan (Ocuvite Lutein and Zeaxanthin) 60 mg-13.5 mg- 15 mg-2 mg-6 mg capsule Take by mouth.       No current facility-administered medications on file prior to visit.              Objective   Visit Vitals  /60 (BP Location: Left arm, Patient Position: Sitting)   Pulse 74   Temp 36.1 °C (97 °F) (Temporal)   Resp 16   Wt 78 kg (172 lb)   BMI 26.94 kg/m²   Smoking Status Never   BSA 1.92 m²          3/22/2024    10:43 AM 6/21/2024    11:38 AM 7/31/2024     2:05 PM 9/25/2024    10:51 AM   Vitals   Systolic 108 118 110 100   Diastolic 65 70 64 60   Heart Rate 70 64 71 74   Temp  36.1 °C (97 °F) 35.9 °C (96.6 °F) 36.1 °C (97 °F)   Resp 16 18  16   Height (in) 1.702 m (5' 7\")      Weight (lb) 166 171  172   BMI 26 kg/m2 26.78 kg/m2  26.94 kg/m2   BSA (m2) 1.89 m2 1.92 m2  1.92 m2   Visit Report Report Report Report Report     Physical Exam  Bilateral leg edema 4 +   With erythema   Concerning for cellulitis.       Assessment & Plan  Cellulitis of right lower extremity  Leg edema with cellulitis   Will treat with cephalexin   Advised to " elevate legs   Will check kidney function   If permits will increase furosemide to BID   He was advised to contact his cardiologist who has been managing his diuretics.   Orders:    cephalexin (Keflex) 250 mg capsule; Take 1 capsule (250 mg) by mouth 2 times a day for 7 days.    CBC and Auto Differential; Future    Basic metabolic panel; Future    Needs flu shot  Given today   Orders:    Flu vaccine, trivalent, preservative free, HIGH-DOSE, age 65y+ (Fluzone)    CBC and Auto Differential; Future    Basic metabolic panel; Future

## 2024-09-27 ENCOUNTER — TELEPHONE (OUTPATIENT)
Dept: PRIMARY CARE | Facility: CLINIC | Age: 89
End: 2024-09-27
Payer: COMMERCIAL

## 2024-09-27 NOTE — TELEPHONE ENCOUNTER
[facility]      [facility] CALL[facility] BACK AND WAS GIVEN HIS TEST RESULTS AND RECOMMENDATION.

## 2024-09-27 NOTE — TELEPHONE ENCOUNTER
----- Message from Jian Ramos sent at 9/26/2024  3:56 PM EDT -----  Sodium is slightly low , kidney function slighlty decreased, he can take the lasix twice daily but this required close monitoring , specially that the sodium is slightly low ,   It would be a good idea to reach to his cardiology for recommendation regarding diuretic.

## 2024-10-10 ENCOUNTER — OFFICE VISIT (OUTPATIENT)
Dept: PRIMARY CARE | Facility: CLINIC | Age: 89
End: 2024-10-10
Payer: COMMERCIAL

## 2024-10-10 VITALS
SYSTOLIC BLOOD PRESSURE: 118 MMHG | HEIGHT: 67 IN | DIASTOLIC BLOOD PRESSURE: 68 MMHG | BODY MASS INDEX: 26.84 KG/M2 | TEMPERATURE: 97.4 F | OXYGEN SATURATION: 96 % | WEIGHT: 171 LBS | HEART RATE: 74 BPM | RESPIRATION RATE: 22 BRPM

## 2024-10-10 DIAGNOSIS — J01.90 ACUTE NON-RECURRENT SINUSITIS, UNSPECIFIED LOCATION: ICD-10-CM

## 2024-10-10 DIAGNOSIS — J01.90 ACUTE NON-RECURRENT SINUSITIS, UNSPECIFIED LOCATION: Primary | ICD-10-CM

## 2024-10-10 PROCEDURE — 1036F TOBACCO NON-USER: CPT | Performed by: INTERNAL MEDICINE

## 2024-10-10 PROCEDURE — 1159F MED LIST DOCD IN RCRD: CPT | Performed by: INTERNAL MEDICINE

## 2024-10-10 PROCEDURE — 99213 OFFICE O/P EST LOW 20 MIN: CPT | Performed by: INTERNAL MEDICINE

## 2024-10-10 RX ORDER — AMOXICILLIN AND CLAVULANATE POTASSIUM 875; 125 MG/1; MG/1
875 TABLET, FILM COATED ORAL 2 TIMES DAILY
Qty: 14 TABLET | Refills: 0 | Status: SHIPPED | OUTPATIENT
Start: 2024-10-10 | End: 2024-10-13 | Stop reason: SINTOL

## 2024-10-10 RX ORDER — AMOXICILLIN AND CLAVULANATE POTASSIUM 875; 125 MG/1; MG/1
875 TABLET, FILM COATED ORAL 2 TIMES DAILY
Qty: 14 TABLET | Refills: 0 | Status: CANCELLED | OUTPATIENT
Start: 2024-10-10 | End: 2024-10-17

## 2024-10-10 RX ORDER — AMOXICILLIN AND CLAVULANATE POTASSIUM 875; 125 MG/1; MG/1
875 TABLET, FILM COATED ORAL 2 TIMES DAILY
Qty: 14 TABLET | Refills: 0 | Status: SHIPPED | OUTPATIENT
Start: 2024-10-10 | End: 2024-10-10 | Stop reason: SDUPTHER

## 2024-10-10 NOTE — PROGRESS NOTES
Subjective   Aaron Coto is a 89 y.o. male who presents for evaluation of symptoms of a URI, possible sinusitis. Symptoms include congestion, nasal congestion, no  fever, post nasal drip, productive cough with  white colored sputum, and sinus pressure. Onset of symptoms was 2 days ago and has been unchanged since that time. Treatment to date: none.    Objective   Physical Exam  Constitutional:       Appearance: Normal appearance.   HENT:      Nose: Congestion present.      Mouth/Throat:      Mouth: Mucous membranes are moist.      Pharynx: Oropharynx is clear. No oropharyngeal exudate or posterior oropharyngeal erythema.   Eyes:      Conjunctiva/sclera: Conjunctivae normal.   Cardiovascular:      Rate and Rhythm: Normal rate and regular rhythm.      Heart sounds: Normal heart sounds.   Pulmonary:      Effort: Pulmonary effort is normal.      Breath sounds: Normal breath sounds. No wheezing.   Neurological:      Mental Status: He is alert.          Assessment/Plan   sinusitis and viral upper respiratory illness.    Discussed diagnosis and treatment of URI.  Suggested symptomatic OTC remedies.  Nasal saline spray for congestion.  Follow up as needed.  Decongestants, mucinex, Flonase   Analgesics, vit C  Augmentin po bid # 14   Get flushot, Covid booster

## 2024-10-11 ENCOUNTER — TELEPHONE (OUTPATIENT)
Dept: PRIMARY CARE | Facility: CLINIC | Age: 89
End: 2024-10-11
Payer: COMMERCIAL

## 2024-10-11 DIAGNOSIS — J01.90 ACUTE NON-RECURRENT SINUSITIS, UNSPECIFIED LOCATION: Primary | ICD-10-CM

## 2024-10-11 NOTE — TELEPHONE ENCOUNTER
Patient calling states his pharmacy told him the augmentin you sent in contains sulfa and he can not take it since he is allergic? Can you resend in something else

## 2024-10-13 RX ORDER — DOXYCYCLINE 100 MG/1
100 CAPSULE ORAL 2 TIMES DAILY
Qty: 14 CAPSULE | Refills: 0 | Status: SHIPPED | OUTPATIENT
Start: 2024-10-13 | End: 2024-10-20

## 2024-10-28 DIAGNOSIS — I10 HYPERTENSION, UNSPECIFIED TYPE: ICD-10-CM

## 2024-10-28 RX ORDER — METOPROLOL SUCCINATE 25 MG/1
25 TABLET, EXTENDED RELEASE ORAL DAILY
Qty: 90 TABLET | Refills: 3 | Status: SHIPPED | OUTPATIENT
Start: 2024-10-28

## 2024-11-13 ENCOUNTER — TELEMEDICINE (OUTPATIENT)
Dept: PRIMARY CARE | Facility: CLINIC | Age: 89
End: 2024-11-13
Payer: COMMERCIAL

## 2024-11-13 DIAGNOSIS — I35.0 AORTIC VALVE STENOSIS, ETIOLOGY OF CARDIAC VALVE DISEASE UNSPECIFIED: Primary | ICD-10-CM

## 2024-11-13 DIAGNOSIS — N40.0 BENIGN PROSTATIC HYPERPLASIA, UNSPECIFIED WHETHER LOWER URINARY TRACT SYMPTOMS PRESENT: ICD-10-CM

## 2024-11-13 DIAGNOSIS — I50.9 CONGESTIVE HEART FAILURE, UNSPECIFIED HF CHRONICITY, UNSPECIFIED HEART FAILURE TYPE: ICD-10-CM

## 2024-11-13 PROCEDURE — 99442 PR PHYS/QHP TELEPHONE EVALUATION 11-20 MIN: CPT | Performed by: FAMILY MEDICINE

## 2024-11-13 RX ORDER — FINASTERIDE 5 MG/1
5 TABLET, FILM COATED ORAL DAILY
Qty: 90 TABLET | Refills: 3 | Status: CANCELLED | OUTPATIENT
Start: 2024-11-13

## 2024-11-13 RX ORDER — CALCIUM CARB/MAGNESIUM OXID/D3 400MG-133
TABLET ORAL
Start: 2024-11-13

## 2024-11-13 RX ORDER — MV-MIN/FOLIC/K1/LYCOPEN/LUTEIN 300-60 MCG
TABLET ORAL
Start: 2024-11-13

## 2024-11-13 RX ORDER — ASCORBIC ACID 500 MG
500 TABLET ORAL DAILY
Start: 2024-11-13 | End: 2025-11-13

## 2024-11-13 NOTE — PROGRESS NOTES
Virtual or Telephone Consent    A telephone visit (audio only) between the patient (at the originating site) and the provider (at the distant site) was utilized to provide this telehealth service.   Verbal consent was requested and obtained from Aaron Coto on this date, 11/13/24 for a telehealth visit.     Subjective   Patient ID: Aaron Coto is a 89 y.o. male who presents for follow up visit   HPI  This is a telephone call with the patient and his daughter, patient currently resides at independent living, he is looking to move to assisted living so he can get more help with getting dressed as it has become more difficult for him.  He is 89 years old, he has CHF with reduced ejection fraction, A-fib, he is getting short of breath with any activity, is following regularly with cardiology.  Patient is DNR when the time comes we wamnts natural death, comfort measures.  Review of Systems    Past Medical History:   Diagnosis Date    Chronic atrial fibrillation, unspecified (Multi)     Chronic atrial fibrillation    Chronic diastolic (congestive) heart failure 03/18/2021    Chronic diastolic congestive heart failure    Presence of cardiac pacemaker     Cardiac pacemaker       No past surgical history on file.   Social History     Socioeconomic History    Marital status:    Tobacco Use    Smoking status: Never    Smokeless tobacco: Never    Tobacco comments:     QUIT IN 1966   Substance and Sexual Activity    Alcohol use: Yes     Alcohol/week: 1.0 standard drink of alcohol     Types: 1 Glasses of wine per week    Drug use: Never     Social Drivers of Health     Financial Resource Strain: Low Risk  (9/26/2023)    Received from [facility]    Overall Financial Resource Strain (CARDIA)     Difficulty of Paying Living Expenses: Not hard at all   Food Insecurity: No Food Insecurity (9/26/2023)    Received from [facility]    Hunger Vital Sign     Worried About  Running Out of Food in the Last Year: Never true     Ran Out of Food in the Last Year: Never true   Transportation Needs: No Transportation Needs (9/26/2023)    Received from [facility]    PRAPARE - Transportation     Lack of Transportation (Medical): No     Lack of Transportation (Non-Medical): No   Housing Stability: Low Risk  (9/26/2023)    Received from [facility]    Housing Stability Vital Sign     Unable to Pay for Housing in the Last Year: No     Number of Places Lived in the Last Year: 1     Unstable Housing in the Last Year: No      Family History   Problem Relation Name Age of Onset    Liver cancer Father         MEDICATIONS AND ALLERGIES:    ALLERGIES Cat hair standardized allergenic extract, Sacubitril-valsartan, and Sulfa (sulfonamide antibiotics)    MEDICATIONS   Current Outpatient Medications on File Prior to Visit   Medication Sig Dispense Refill    finasteride (Proscar) 5 mg tablet TAKE 1 TABLET DAILY 90 tablet 3    furosemide (Lasix) 40 mg tablet Take 1 tablet (40 mg) by mouth once daily.      levothyroxine (Synthroid, Levoxyl) 100 mcg tablet TAKE 1 TABLET DAILY 90 tablet 3    metoprolol succinate XL (Toprol-XL) 25 mg 24 hr tablet TAKE 1 TABLET DAILY 90 tablet 3    rivaroxaban (Xarelto) 15 mg tablet Take 1 tablet (15 mg) by mouth once daily.      spironolactone (Aldactone) 25 mg tablet Take 1 tablet (25 mg) by mouth once daily.      tamsulosin (Flomax) 0.4 mg 24 hr capsule Take 1 capsule (0.4 mg) by mouth once daily.      vit C,I-Wa-pdoic-lutein-zeaxan (Ocuvite Lutein and Zeaxanthin) 60 mg-13.5 mg- 15 mg-2 mg-6 mg capsule Take by mouth.       No current facility-administered medications on file prior to visit.              Objective   Visit Vitals  Smoking Status Never          3/22/2024    10:43 AM 6/21/2024    11:38 AM 7/31/2024     2:05 PM 9/25/2024    10:51 AM 10/10/2024     3:16 PM   Vitals   Systolic 108 118 110 100 118   Diastolic 65 70 64 60  "68   Heart Rate 70 64 71 74 74   Temp  36.1 °C (97 °F) 35.9 °C (96.6 °F) 36.1 °C (97 °F) 36.3 °C (97.4 °F)   Resp 16 18  16 22   Height (in) 1.702 m (5' 7\")    1.702 m (5' 7\")   Weight (lb) 166 171  172 171   BMI 26 kg/m2 26.78 kg/m2  26.94 kg/m2 26.78 kg/m2   BSA (m2) 1.89 m2 1.92 m2  1.92 m2 1.92 m2   Visit Report Report Report Report Report Report     Physical Exam    Patient is awake responsive, normal train of thoughts, responds appropriately    Assessment & Plan  Aortic valve stenosis, etiology of cardiac valve disease unspecified  Follow-up with cardiology, patient with history of CHF, no acute exacerbation mild stenosis,  Orders:    mv-min-folic-K1-lycopen-lutein (Centrum Silver Men) 517--300 mcg tablet; 1 tablet daily    calcium carb-mag oxide-vit D3 (Calcium Magnesium plus D) 400-167-133 mg-mg-unit tablet; 1 tablet daily , ok to change as over the counter.    ascorbic acid (Vitamin C) 500 mg tablet; Take 1 tablet (500 mg) by mouth once daily.    Benign prostatic hyperplasia, unspecified whether lower urinary tract symptoms present  Continue current treatment       Congestive heart failure, unspecified HF chronicity, unspecified heart failure type  Patient with CHF with reduced ejection fraction, not currently in distress , following with cardiology        Patient visit completed via telephone visit. I spent 20 minutes on the phone with the patient and instructed them to contact me with any questions or new concerns.           "

## 2024-11-13 NOTE — ASSESSMENT & PLAN NOTE
Follow-up with cardiology, patient with history of CHF, no acute exacerbation mild stenosis,  Orders:    mv-min-folic-K1-lycopen-lutein (Centrum Silver Men) 975--300 mcg tablet; 1 tablet daily    calcium carb-mag oxide-vit D3 (Calcium Magnesium plus D) 400-167-133 mg-mg-unit tablet; 1 tablet daily , ok to change as over the counter.    ascorbic acid (Vitamin C) 500 mg tablet; Take 1 tablet (500 mg) by mouth once daily.

## 2024-11-18 ENCOUNTER — APPOINTMENT (OUTPATIENT)
Dept: PRIMARY CARE | Facility: CLINIC | Age: 89
End: 2024-11-18
Payer: COMMERCIAL

## 2024-11-18 ENCOUNTER — LAB (OUTPATIENT)
Dept: LAB | Facility: LAB | Age: 89
End: 2024-11-18
Payer: COMMERCIAL

## 2024-11-18 VITALS
HEART RATE: 70 BPM | HEIGHT: 67 IN | RESPIRATION RATE: 16 BRPM | WEIGHT: 163 LBS | BODY MASS INDEX: 25.58 KG/M2 | OXYGEN SATURATION: 93 % | SYSTOLIC BLOOD PRESSURE: 112 MMHG | DIASTOLIC BLOOD PRESSURE: 71 MMHG

## 2024-11-18 DIAGNOSIS — E87.1 HYPONATREMIA: Primary | ICD-10-CM

## 2024-11-18 DIAGNOSIS — I50.9 CONGESTIVE HEART FAILURE, UNSPECIFIED HF CHRONICITY, UNSPECIFIED HEART FAILURE TYPE: ICD-10-CM

## 2024-11-18 DIAGNOSIS — E87.1 HYPONATREMIA: ICD-10-CM

## 2024-11-18 LAB
ALBUMIN SERPL BCP-MCNC: 3.2 G/DL (ref 3.4–5)
ALP SERPL-CCNC: 97 U/L (ref 33–136)
ALT SERPL W P-5'-P-CCNC: 17 U/L (ref 10–52)
ANION GAP SERPL CALC-SCNC: 9 MMOL/L (ref 10–20)
AST SERPL W P-5'-P-CCNC: 24 U/L (ref 9–39)
BASOPHILS # BLD AUTO: 0.01 X10*3/UL (ref 0–0.1)
BASOPHILS NFR BLD AUTO: 0.1 %
BILIRUB SERPL-MCNC: 0.8 MG/DL (ref 0–1.2)
BUN SERPL-MCNC: 40 MG/DL (ref 6–23)
CALCIUM SERPL-MCNC: 8.3 MG/DL (ref 8.6–10.3)
CHLORIDE SERPL-SCNC: 99 MMOL/L (ref 98–107)
CO2 SERPL-SCNC: 29 MMOL/L (ref 21–32)
CREAT SERPL-MCNC: 1 MG/DL (ref 0.5–1.3)
EGFRCR SERPLBLD CKD-EPI 2021: 68 ML/MIN/1.73M*2
EOSINOPHIL # BLD AUTO: 0.23 X10*3/UL (ref 0–0.4)
EOSINOPHIL NFR BLD AUTO: 2.4 %
ERYTHROCYTE [DISTWIDTH] IN BLOOD BY AUTOMATED COUNT: 15.6 % (ref 11.5–14.5)
GLUCOSE SERPL-MCNC: 75 MG/DL (ref 74–99)
HCT VFR BLD AUTO: 35 % (ref 41–52)
HGB BLD-MCNC: 11.4 G/DL (ref 13.5–17.5)
IMM GRANULOCYTES # BLD AUTO: 0.02 X10*3/UL (ref 0–0.5)
IMM GRANULOCYTES NFR BLD AUTO: 0.2 % (ref 0–0.9)
LYMPHOCYTES # BLD AUTO: 3.32 X10*3/UL (ref 0.8–3)
LYMPHOCYTES NFR BLD AUTO: 35 %
MCH RBC QN AUTO: 31.7 PG (ref 26–34)
MCHC RBC AUTO-ENTMCNC: 32.6 G/DL (ref 32–36)
MCV RBC AUTO: 97 FL (ref 80–100)
MONOCYTES # BLD AUTO: 1.06 X10*3/UL (ref 0.05–0.8)
MONOCYTES NFR BLD AUTO: 11.2 %
NEUTROPHILS # BLD AUTO: 4.84 X10*3/UL (ref 1.6–5.5)
NEUTROPHILS NFR BLD AUTO: 51.1 %
NRBC BLD-RTO: 0 /100 WBCS (ref 0–0)
PLATELET # BLD AUTO: 209 X10*3/UL (ref 150–450)
POTASSIUM SERPL-SCNC: 4.5 MMOL/L (ref 3.5–5.3)
PROT SERPL-MCNC: 6.4 G/DL (ref 6.4–8.2)
RBC # BLD AUTO: 3.6 X10*6/UL (ref 4.5–5.9)
SODIUM SERPL-SCNC: 132 MMOL/L (ref 136–145)
WBC # BLD AUTO: 9.5 X10*3/UL (ref 4.4–11.3)

## 2024-11-18 PROCEDURE — 99214 OFFICE O/P EST MOD 30 MIN: CPT | Performed by: FAMILY MEDICINE

## 2024-11-18 PROCEDURE — 1159F MED LIST DOCD IN RCRD: CPT | Performed by: FAMILY MEDICINE

## 2024-11-18 PROCEDURE — G2211 COMPLEX E/M VISIT ADD ON: HCPCS | Performed by: FAMILY MEDICINE

## 2024-11-18 PROCEDURE — 85025 COMPLETE CBC W/AUTO DIFF WBC: CPT

## 2024-11-18 PROCEDURE — 80053 COMPREHEN METABOLIC PANEL: CPT

## 2024-11-18 PROCEDURE — 36415 COLL VENOUS BLD VENIPUNCTURE: CPT

## 2024-11-18 PROCEDURE — 1036F TOBACCO NON-USER: CPT | Performed by: FAMILY MEDICINE

## 2024-11-18 NOTE — ASSESSMENT & PLAN NOTE
WILL RECHECK LABS TODAY   Orders:    CBC and Auto Differential; Future    Comprehensive metabolic panel; Future

## 2024-11-18 NOTE — PROGRESS NOTES
Subjective   Patient ID: Aaron Coto is a 89 y.o. male who presents for Follow-up (HERE WITH CÉSAR (SON) /SENT HOME FROM [facility] NOV 8TH 2024/WENT WITHOUT  THYROID PILL FOR ABOUT A WEEK ).  HPI  He has hx of afib , CHF, BPH , arthritis , vertigo.      He saw neurologist , MRI reassuring   Recently saw his electrophysiologist , every thing is stable.      RECENTLY RELOCATED FROM INDEPENDENT LIVING TO ASSISTED LIVING   HE IS HERE TODAY WITH HIS SON CÉSAR      Uses the electric scooter.      No recent event    Review of Systems    Past Medical History:   Diagnosis Date    Chronic atrial fibrillation, unspecified (Multi)     Chronic atrial fibrillation    Chronic diastolic (congestive) heart failure 03/18/2021    Chronic diastolic congestive heart failure    Presence of cardiac pacemaker     Cardiac pacemaker       History reviewed. No pertinent surgical history.   Social History     Socioeconomic History    Marital status:    Tobacco Use    Smoking status: Never    Smokeless tobacco: Never    Tobacco comments:     QUIT IN 1966   Substance and Sexual Activity    Alcohol use: Yes     Alcohol/week: 1.0 standard drink of alcohol     Types: 1 Glasses of wine per week    Drug use: Never     Social Drivers of Health     Financial Resource Strain: Low Risk  (9/26/2023)    Received from [facility]    Overall Financial Resource Strain (CARDIA)     Difficulty of Paying Living Expenses: Not hard at all   Food Insecurity: No Food Insecurity (9/26/2023)    Received from [facility]    Hunger Vital Sign     Worried About Running Out of Food in the Last Year: Never true     Ran Out of Food in the Last Year: Never true   Transportation Needs: No Transportation Needs (9/26/2023)    [facility]    PRAPARE - Transportation     Lack of Transportation (Medical): No     Lack of Transportation (Non-Medical): No   Housing Stability: Low Risk   "(9/26/2023)    [facility]    Housing Stability Vital Sign     Unable to Pay for Housing in the Last Year: No     Number of Places Lived in the Last Year: 1     Unstable Housing in the Last Year: No      Family History   Problem Relation Name Age of Onset    Liver cancer Father         MEDICATIONS AND ALLERGIES:    ALLERGIES Cat hair standardized allergenic extract, Sacubitril-valsartan, and Sulfa (sulfonamide antibiotics)    MEDICATIONS   Current Outpatient Medications on File Prior to Visit   Medication Sig Dispense Refill    ascorbic acid (Vitamin C) 500 mg tablet Take 1 tablet (500 mg) by mouth once daily.      calcium carb-mag oxide-vit D3 (Calcium Magnesium plus D) 400-167-133 mg-mg-unit tablet 1 tablet daily , ok to change as over the counter.      finasteride (Proscar) 5 mg tablet TAKE 1 TABLET DAILY 90 tablet 3    furosemide (Lasix) 40 mg tablet Take 1 tablet (40 mg) by mouth once daily.      levothyroxine (Synthroid, Levoxyl) 100 mcg tablet TAKE 1 TABLET DAILY 90 tablet 3    metoprolol succinate XL (Toprol-XL) 25 mg 24 hr tablet TAKE 1 TABLET DAILY 90 tablet 3    mv-min-folic-K1-lycopen-lutein (Centrum Silver Men) 317--300 mcg tablet 1 tablet daily      rivaroxaban (Xarelto) 15 mg tablet Take 1 tablet (15 mg) by mouth once daily.      spironolactone (Aldactone) 25 mg tablet Take 1 tablet (25 mg) by mouth once daily.      tamsulosin (Flomax) 0.4 mg 24 hr capsule Take 1 capsule (0.4 mg) by mouth once daily.      vit C,F-Uy-pairi-lutein-zeaxan (Ocuvite Lutein and Zeaxanthin) 60 mg-13.5 mg- 15 mg-2 mg-6 mg capsule Take by mouth.       No current facility-administered medications on file prior to visit.              Objective   Visit Vitals  /71   Pulse 70   Resp 16   Ht 1.702 m (5' 7\")   Wt 73.9 kg (163 lb)   SpO2 93%   BMI 25.53 kg/m²   Smoking Status Never   BSA 1.87 m²          3/22/2024    10:43 AM 6/21/2024    11:38 AM 7/31/2024     2:05 PM 9/25/2024    " "10:51 AM 10/10/2024     3:16 PM 11/18/2024    10:41 AM   Vitals   Systolic 108 118 110 100 118 112   Diastolic 65 70 64 60 68 71   Heart Rate 70 64 71 74 74 70   Temp  36.1 °C (97 °F) 35.9 °C (96.6 °F) 36.1 °C (97 °F) 36.3 °C (97.4 °F)    Resp 16 18  16 22 16   Height (in) 1.702 m (5' 7\")    1.702 m (5' 7\") 1.702 m (5' 7\")   Weight (lb) 166 171  172 171 163   BMI 26 kg/m2 26.78 kg/m2  26.94 kg/m2 26.78 kg/m2 25.53 kg/m2   BSA (m2) 1.89 m2 1.92 m2  1.92 m2 1.92 m2 1.87 m2   Visit Report Report Report Report Report Report Report     Physical Exam    LUNGS CLEAR   BILATEARL LEG SWELLING   COMPRESSION STOCKING HELPS    Assessment & Plan  Hyponatremia  WILL RECHECK LABS TODAY   Orders:    CBC and Auto Differential; Future    Comprehensive metabolic panel; Future    Congestive heart failure, unspecified HF chronicity, unspecified heart failure type  STABLE  FOLLOW WITH CARDIOLOGY   ADVISED ON PROPER COMPRESISON STOCKING TECHINQUES TO PREVENT BLOOD CLOTS ,PATIENT IS ON XARELTO   Orders:    CBC and Auto Differential; Future    Comprehensive metabolic panel; Future             "

## 2025-01-07 ENCOUNTER — APPOINTMENT (OUTPATIENT)
Dept: PRIMARY CARE | Facility: CLINIC | Age: OVER 89
End: 2025-01-07
Payer: COMMERCIAL

## 2025-01-07 ENCOUNTER — LAB (OUTPATIENT)
Dept: LAB | Facility: LAB | Age: OVER 89
End: 2025-01-07
Payer: COMMERCIAL

## 2025-01-07 VITALS
BODY MASS INDEX: 25.69 KG/M2 | RESPIRATION RATE: 18 BRPM | TEMPERATURE: 96.8 F | SYSTOLIC BLOOD PRESSURE: 121 MMHG | WEIGHT: 164 LBS | DIASTOLIC BLOOD PRESSURE: 69 MMHG | HEART RATE: 69 BPM

## 2025-01-07 DIAGNOSIS — E87.1 HYPONATREMIA: ICD-10-CM

## 2025-01-07 DIAGNOSIS — I83.029 VENOUS STASIS ULCERS OF BOTH LOWER EXTREMITIES (MULTI): ICD-10-CM

## 2025-01-07 DIAGNOSIS — E03.9 HYPOTHYROIDISM, UNSPECIFIED TYPE: ICD-10-CM

## 2025-01-07 DIAGNOSIS — D64.9 ANEMIA, UNSPECIFIED TYPE: ICD-10-CM

## 2025-01-07 DIAGNOSIS — I50.9 CONGESTIVE HEART FAILURE, UNSPECIFIED HF CHRONICITY, UNSPECIFIED HEART FAILURE TYPE: ICD-10-CM

## 2025-01-07 DIAGNOSIS — J43.9 PULMONARY EMPHYSEMA, UNSPECIFIED EMPHYSEMA TYPE (MULTI): ICD-10-CM

## 2025-01-07 DIAGNOSIS — I83.019 VENOUS STASIS ULCERS OF BOTH LOWER EXTREMITIES (MULTI): ICD-10-CM

## 2025-01-07 DIAGNOSIS — I50.23 ACUTE ON CHRONIC SYSTOLIC HEART FAILURE: ICD-10-CM

## 2025-01-07 DIAGNOSIS — L97.919 VENOUS STASIS ULCERS OF BOTH LOWER EXTREMITIES (MULTI): ICD-10-CM

## 2025-01-07 DIAGNOSIS — N18.31 STAGE 3A CHRONIC KIDNEY DISEASE (MULTI): ICD-10-CM

## 2025-01-07 DIAGNOSIS — L97.929 VENOUS STASIS ULCERS OF BOTH LOWER EXTREMITIES (MULTI): ICD-10-CM

## 2025-01-07 DIAGNOSIS — E87.1 HYPONATREMIA: Primary | ICD-10-CM

## 2025-01-07 LAB
ALBUMIN SERPL BCP-MCNC: 3.4 G/DL (ref 3.4–5)
ALP SERPL-CCNC: 95 U/L (ref 33–136)
ALT SERPL W P-5'-P-CCNC: 13 U/L (ref 10–52)
ANION GAP SERPL CALC-SCNC: 12 MMOL/L (ref 10–20)
AST SERPL W P-5'-P-CCNC: 24 U/L (ref 9–39)
BASOPHILS # BLD AUTO: 0.02 X10*3/UL (ref 0–0.1)
BASOPHILS NFR BLD AUTO: 0.2 %
BILIRUB SERPL-MCNC: 0.9 MG/DL (ref 0–1.2)
BUN SERPL-MCNC: 30 MG/DL (ref 6–23)
CALCIUM SERPL-MCNC: 8.5 MG/DL (ref 8.6–10.3)
CHLORIDE SERPL-SCNC: 100 MMOL/L (ref 98–107)
CO2 SERPL-SCNC: 27 MMOL/L (ref 21–32)
CREAT SERPL-MCNC: 1.05 MG/DL (ref 0.5–1.3)
EGFRCR SERPLBLD CKD-EPI 2021: 64 ML/MIN/1.73M*2
EOSINOPHIL # BLD AUTO: 0.14 X10*3/UL (ref 0–0.4)
EOSINOPHIL NFR BLD AUTO: 1.7 %
ERYTHROCYTE [DISTWIDTH] IN BLOOD BY AUTOMATED COUNT: 15.6 % (ref 11.5–14.5)
GLUCOSE SERPL-MCNC: 82 MG/DL (ref 74–99)
HCT VFR BLD AUTO: 38.9 % (ref 41–52)
HGB BLD-MCNC: 12.6 G/DL (ref 13.5–17.5)
IMM GRANULOCYTES # BLD AUTO: 0.02 X10*3/UL (ref 0–0.5)
IMM GRANULOCYTES NFR BLD AUTO: 0.2 % (ref 0–0.9)
LYMPHOCYTES # BLD AUTO: 3.21 X10*3/UL (ref 0.8–3)
LYMPHOCYTES NFR BLD AUTO: 39.4 %
MCH RBC QN AUTO: 31.5 PG (ref 26–34)
MCHC RBC AUTO-ENTMCNC: 32.4 G/DL (ref 32–36)
MCV RBC AUTO: 97 FL (ref 80–100)
MONOCYTES # BLD AUTO: 0.72 X10*3/UL (ref 0.05–0.8)
MONOCYTES NFR BLD AUTO: 8.8 %
NEUTROPHILS # BLD AUTO: 4.04 X10*3/UL (ref 1.6–5.5)
NEUTROPHILS NFR BLD AUTO: 49.7 %
NRBC BLD-RTO: 0 /100 WBCS (ref 0–0)
PLATELET # BLD AUTO: 186 X10*3/UL (ref 150–450)
POTASSIUM SERPL-SCNC: 4 MMOL/L (ref 3.5–5.3)
PROT SERPL-MCNC: 6.5 G/DL (ref 6.4–8.2)
RBC # BLD AUTO: 4 X10*6/UL (ref 4.5–5.9)
SODIUM SERPL-SCNC: 135 MMOL/L (ref 136–145)
TSH SERPL-ACNC: 0.82 MIU/L (ref 0.44–3.98)
WBC # BLD AUTO: 8.2 X10*3/UL (ref 4.4–11.3)

## 2025-01-07 PROCEDURE — 1036F TOBACCO NON-USER: CPT | Performed by: FAMILY MEDICINE

## 2025-01-07 PROCEDURE — 99214 OFFICE O/P EST MOD 30 MIN: CPT | Performed by: FAMILY MEDICINE

## 2025-01-07 PROCEDURE — 1159F MED LIST DOCD IN RCRD: CPT | Performed by: FAMILY MEDICINE

## 2025-01-07 PROCEDURE — 85025 COMPLETE CBC W/AUTO DIFF WBC: CPT

## 2025-01-07 PROCEDURE — G2211 COMPLEX E/M VISIT ADD ON: HCPCS | Performed by: FAMILY MEDICINE

## 2025-01-07 PROCEDURE — 80053 COMPREHEN METABOLIC PANEL: CPT

## 2025-01-07 PROCEDURE — 84443 ASSAY THYROID STIM HORMONE: CPT

## 2025-01-07 NOTE — ASSESSMENT & PLAN NOTE
WILL CHECK THE LABS BELOW   THIS IS A CHRONIC ISSUES.   Orders:    CBC and Auto Differential; Future    Comprehensive Metabolic Panel; Future    TSH with reflex to Free T4 if abnormal; Future

## 2025-01-07 NOTE — ASSESSMENT & PLAN NOTE
WILL RECHECK THYROID FUNCTION TEST.   Orders:    CBC and Auto Differential; Future    Comprehensive Metabolic Panel; Future    TSH with reflex to Free T4 if abnormal; Future

## 2025-01-07 NOTE — PROGRESS NOTES
Subjective   Patient ID: Aaron Coto is a 89 y.o. male who presents for Follow-up (3 MONTH FU ).  HPI  He has hx of afib , CHF, BPH , arthritis , vertigo.      He saw neurologist , MRI reassuring   Recently saw his electrophysiologist , every thing is stable.      RECENTLY RELOCATED FROM INDEPENDENT LIVING TO ASSISTED LIVING   HE IS HERE TODAY WITH HIS SON CÉSAR      Uses the electric scooter.      No recent event  Review of Systems    Past Medical History:   Diagnosis Date    Chronic atrial fibrillation, unspecified (Multi)     Chronic atrial fibrillation    Chronic diastolic (congestive) heart failure 03/18/2021    Chronic diastolic congestive heart failure    Presence of cardiac pacemaker     Cardiac pacemaker       History reviewed. No pertinent surgical history.   Social History     Socioeconomic History    Marital status:    Tobacco Use    Smoking status: Never    Smokeless tobacco: Never    Tobacco comments:     QUIT IN 1966   Substance and Sexual Activity    Alcohol use: Yes     Alcohol/week: 1.0 standard drink of alcohol     Types: 1 Glasses of wine per week    Drug use: Never     Social Drivers of Health     Financial Resource Strain: Low Risk  (9/26/2023)    Received from [facility]    Overall Financial Resource Strain (CARDIA)     Difficulty of Paying Living Expenses: Not hard at all   Food Insecurity: No Food Insecurity (9/26/2023)    Received from [facility]    Hunger Vital Sign     Worried About Running Out of Food in the Last Year: Never true     Ran Out of Food in the Last Year: Never true   Transportation Needs: No Transportation Needs (9/26/2023)    Received from [facility]    PRAPARE - Transportation     Lack of Transportation (Medical): No     Lack of Transportation (Non-Medical): No   Housing Stability: Low Risk  (9/26/2023)    Received from [facility]    Housing Stability Vital Sign      Unable to Pay for Housing in the Last Year: No     Number of Places Lived in the Last Year: 1     Unstable Housing in the Last Year: No      Family History   Problem Relation Name Age of Onset    Liver cancer Father         MEDICATIONS AND ALLERGIES:    ALLERGIES Cat hair standardized allergenic extract, Sacubitril-valsartan, and Sulfa (sulfonamide antibiotics)    MEDICATIONS   Current Outpatient Medications on File Prior to Visit   Medication Sig Dispense Refill    finasteride (Proscar) 5 mg tablet TAKE 1 TABLET DAILY 90 tablet 3    furosemide (Lasix) 40 mg tablet Take 1 tablet (40 mg) by mouth once daily.      levothyroxine (Synthroid, Levoxyl) 100 mcg tablet TAKE 1 TABLET DAILY 90 tablet 3    metoprolol succinate XL (Toprol-XL) 25 mg 24 hr tablet TAKE 1 TABLET DAILY 90 tablet 3    mv-min-folic-K1-lycopen-lutein (Centrum Silver Men) 978--300 mcg tablet 1 tablet daily      rivaroxaban (Xarelto) 15 mg tablet Take 1 tablet (15 mg) by mouth once daily.      spironolactone (Aldactone) 25 mg tablet Take 1 tablet (25 mg) by mouth once daily.      tamsulosin (Flomax) 0.4 mg 24 hr capsule Take 1 capsule (0.4 mg) by mouth once daily.      ascorbic acid (Vitamin C) 500 mg tablet Take 1 tablet (500 mg) by mouth once daily.      calcium carb-mag oxide-vit D3 (Calcium Magnesium plus D) 400-167-133 mg-mg-unit tablet 1 tablet daily , ok to change as over the counter.      vit C,I-Hw-vtkcy-lutein-zeaxan (Ocuvite Lutein and Zeaxanthin) 60 mg-13.5 mg- 15 mg-2 mg-6 mg capsule Take by mouth.       No current facility-administered medications on file prior to visit.              Objective   Visit Vitals  /69 (BP Location: Right arm, Patient Position: Sitting)   Pulse 69   Temp 36 °C (96.8 °F) (Temporal)   Resp 18   Wt 74.4 kg (164 lb)   BMI 25.69 kg/m²   Smoking Status Never   BSA 1.88 m²          3/22/2024    10:43 AM 6/21/2024    11:38 AM 7/31/2024     2:05 PM 9/25/2024    10:51 AM 10/10/2024     3:16 PM 11/18/2024     "10:41 AM 1/7/2025    10:41 AM   Vitals   Systolic 108 118 110 100 118 112 121   Diastolic 65 70 64 60 68 71 69   BP Location   Left arm Left arm Right arm  Right arm   Heart Rate 70 64 71 74 74 70 69   Temp  36.1 °C (97 °F) 35.9 °C (96.6 °F) 36.1 °C (97 °F) 36.3 °C (97.4 °F)  36 °C (96.8 °F)   Resp 16 18  16 22 16 18   Height 1.702 m (5' 7\")    1.702 m (5' 7\") 1.702 m (5' 7\")    Weight (lb) 166 171  172 171 163 164   BMI 26 kg/m2 26.78 kg/m2  26.94 kg/m2 26.78 kg/m2 25.53 kg/m2 25.69 kg/m2   BSA (m2) 1.89 m2 1.92 m2  1.92 m2 1.92 m2 1.87 m2 1.88 m2   Visit Report Report Report Report Report Report Report Report     Physical Exam  Constitutional: awake; alert, interactive; in no acute distress; well nourished and well developed  ENT: ears and nose were normal in appearance;  Eyes: pupils equal and round  Pulmonary: no respiratory distress and normal respiratory rhythm and effort  Skin: normal skin color and pigmentation;  Psychiatric: oriented to person, place, and time; affect was normal and the mood was normal         Assessment & Plan  Hyponatremia  WILL CHECK THE LABS BELOW   THIS IS A CHRONIC ISSUES.   Orders:    CBC and Auto Differential; Future    Comprehensive Metabolic Panel; Future    TSH with reflex to Free T4 if abnormal; Future    Anemia, unspecified type  WILL RECHECK   Orders:    CBC and Auto Differential; Future    Comprehensive Metabolic Panel; Future    TSH with reflex to Free T4 if abnormal; Future    Hypothyroidism, unspecified type  WILL RECHECK THYROID FUNCTION TEST.   Orders:    CBC and Auto Differential; Future    Comprehensive Metabolic Panel; Future    TSH with reflex to Free T4 if abnormal; Future    Congestive heart failure, unspecified HF chronicity, unspecified heart failure type  Stable followingo with cardioloyg        Venous stasis ulcers of both lower extremities (Multi)  Resolved.   Tolerating compression stocking       Stage 3a chronic kidney disease (Multi)  Will recheck   Avoid " nephrotoxic drugs       Pulmonary emphysema, unspecified emphysema type (Multi)  No acute complaints        Acute on chronic systolic heart failure  Stable   Following with cardiology

## 2025-01-09 ENCOUNTER — TELEPHONE (OUTPATIENT)
Dept: PRIMARY CARE | Facility: CLINIC | Age: OVER 89
End: 2025-01-09
Payer: COMMERCIAL

## 2025-01-09 NOTE — TELEPHONE ENCOUNTER
----- Message from Jian Ramos sent at 1/8/2025  1:01 PM EST -----  Sodium looks good 135   Kdiney function normal   Blood counts improved hemoglobin increased to 12.6, was 11.4 last checked.   Thyroid function normal

## 2025-02-05 ENCOUNTER — OFFICE VISIT (OUTPATIENT)
Dept: PRIMARY CARE | Facility: CLINIC | Age: OVER 89
End: 2025-02-05
Payer: COMMERCIAL

## 2025-02-05 ENCOUNTER — TELEPHONE (OUTPATIENT)
Dept: PRIMARY CARE | Facility: CLINIC | Age: OVER 89
End: 2025-02-05

## 2025-02-05 VITALS
HEIGHT: 67 IN | HEART RATE: 72 BPM | TEMPERATURE: 96.9 F | DIASTOLIC BLOOD PRESSURE: 60 MMHG | OXYGEN SATURATION: 96 % | SYSTOLIC BLOOD PRESSURE: 110 MMHG | BODY MASS INDEX: 25.9 KG/M2 | WEIGHT: 165 LBS

## 2025-02-05 DIAGNOSIS — R41.82 ALTERED MENTAL STATUS, UNSPECIFIED ALTERED MENTAL STATUS TYPE: ICD-10-CM

## 2025-02-05 DIAGNOSIS — N39.0 URINARY TRACT INFECTION WITHOUT HEMATURIA, SITE UNSPECIFIED: Primary | ICD-10-CM

## 2025-02-05 LAB
POC APPEARANCE, URINE: ABNORMAL
POC BILIRUBIN, URINE: NEGATIVE
POC BLOOD, URINE: NEGATIVE
POC COLOR, URINE: YELLOW
POC GLUCOSE, URINE: NEGATIVE MG/DL
POC KETONES, URINE: NEGATIVE MG/DL
POC LEUKOCYTES, URINE: NEGATIVE
POC NITRITE,URINE: NEGATIVE
POC PH, URINE: 6 PH
POC PROTEIN, URINE: NEGATIVE MG/DL
POC SPECIFIC GRAVITY, URINE: 1.01
POC UROBILINOGEN, URINE: 0.2 EU/DL

## 2025-02-05 PROCEDURE — G2211 COMPLEX E/M VISIT ADD ON: HCPCS | Performed by: FAMILY MEDICINE

## 2025-02-05 PROCEDURE — 1159F MED LIST DOCD IN RCRD: CPT | Performed by: FAMILY MEDICINE

## 2025-02-05 PROCEDURE — 1036F TOBACCO NON-USER: CPT | Performed by: FAMILY MEDICINE

## 2025-02-05 PROCEDURE — 99214 OFFICE O/P EST MOD 30 MIN: CPT | Performed by: FAMILY MEDICINE

## 2025-02-05 PROCEDURE — 81002 URINALYSIS NONAUTO W/O SCOPE: CPT | Performed by: FAMILY MEDICINE

## 2025-02-05 RX ORDER — CEFDINIR 300 MG/1
300 CAPSULE ORAL 2 TIMES DAILY
Qty: 14 CAPSULE | Refills: 0 | Status: SHIPPED | OUTPATIENT
Start: 2025-02-05 | End: 2025-02-12

## 2025-02-05 ASSESSMENT — PATIENT HEALTH QUESTIONNAIRE - PHQ9
SUM OF ALL RESPONSES TO PHQ9 QUESTIONS 1 AND 2: 0
1. LITTLE INTEREST OR PLEASURE IN DOING THINGS: NOT AT ALL
2. FEELING DOWN, DEPRESSED OR HOPELESS: NOT AT ALL

## 2025-02-05 NOTE — TELEPHONE ENCOUNTER
Patients Karol Zepeda daughter called in patient has a  dropping him off his daughter wanted to let you know that he has been having memory issues and hallucination thought that the ambulance came and told him that he was anemic and nothing happened and daughter thinks that he may have a UTI no symptoms just no control over him peeing his pants and having accidents .           Per Dr. Ramos     I saw the patient , can we let his duagthter knwo that his vitals looked good , but he is pale and fatigued, he was clear minded today , but he meneitoned the hallucination , hopefully it is just a UTI , I sent antiboitoics to be started this afternoon ,please  from [facility], and I will do some blood and urien testing, if he gets better after 2 days of antibiotics then most likely it is the UTI , lungs are clear so hopefully thisis not from the heart.       Called patients daughter and she was advised.

## 2025-02-05 NOTE — PROGRESS NOTES
Subjective   Patient ID: Aaron Coto is a 89 y.o. male who presents for UTI.  HPI  Patient presenting for follow up   RECENTLY HAD CHANGTES IN THE BATTERY for the pace maker   Wound healed well   NO CHEST PAIN     For the last few days , he has been having frequent urination , nocturia and HALLUCINATION withURINE INCONTIENCE.   NOT CONSTIPATED.   NO ABDOMINAL PAIN , NO FEVER, NO CHILLS.           Review of Systems    Past Medical History:   Diagnosis Date    Chronic atrial fibrillation, unspecified (Multi)     Chronic atrial fibrillation    Chronic diastolic (congestive) heart failure 03/18/2021    Chronic diastolic congestive heart failure    Presence of cardiac pacemaker     Cardiac pacemaker       History reviewed. No pertinent surgical history.   Social History     Socioeconomic History    Marital status:    Tobacco Use    Smoking status: Never    Smokeless tobacco: Never    Tobacco comments:     QUIT IN 1966   Vaping Use    Vaping status: Never Used   Substance and Sexual Activity    Alcohol use: Yes     Alcohol/week: 1.0 standard drink of alcohol     Types: 1 Glasses of wine per week    Drug use: Never     Social Drivers of Health     Financial Resource Strain: Low Risk  (9/26/2023)    Received from [facility]    Overall Financial Resource Strain (CARDIA)     Difficulty of Paying Living Expenses: Not hard at all   Food Insecurity: No Food Insecurity (9/26/2023)    Received from [facility]    Hunger Vital Sign     Worried About Running Out of Food in the Last Year: Never true     Ran Out of Food in the Last Year: Never true   Transportation Needs: No Transportation Needs (9/26/2023)    Received from [facility]    PRAPARE - Transportation     Lack of Transportation (Medical): No     Lack of Transportation (Non-Medical): No   Housing Stability: Low Risk  (9/26/2023)    Received from [facility]    Housing  "Stability Vital Sign     Unable to Pay for Housing in the Last Year: No     Number of Places Lived in the Last Year: 1     Unstable Housing in the Last Year: No      Family History   Problem Relation Name Age of Onset    Liver cancer Father         MEDICATIONS AND ALLERGIES:    ALLERGIES Cat hair standardized allergenic extract, Sacubitril-valsartan, and Sulfa (sulfonamide antibiotics)    MEDICATIONS   Current Outpatient Medications on File Prior to Visit   Medication Sig Dispense Refill    ascorbic acid (Vitamin C) 500 mg tablet Take 1 tablet (500 mg) by mouth once daily.      calcium carb-mag oxide-vit D3 (Calcium Magnesium plus D) 400-167-133 mg-mg-unit tablet 1 tablet daily , ok to change as over the counter.      finasteride (Proscar) 5 mg tablet TAKE 1 TABLET DAILY 90 tablet 3    furosemide (Lasix) 40 mg tablet Take 1 tablet (40 mg) by mouth once daily.      levothyroxine (Synthroid, Levoxyl) 100 mcg tablet TAKE 1 TABLET DAILY 90 tablet 3    metoprolol succinate XL (Toprol-XL) 25 mg 24 hr tablet TAKE 1 TABLET DAILY 90 tablet 3    mv-min-folic-K1-lycopen-lutein (Centrum Silver Men) 332--300 mcg tablet 1 tablet daily      rivaroxaban (Xarelto) 15 mg tablet Take 1 tablet (15 mg) by mouth once daily.      spironolactone (Aldactone) 25 mg tablet Take 1 tablet (25 mg) by mouth once daily.      tamsulosin (Flomax) 0.4 mg 24 hr capsule Take 1 capsule (0.4 mg) by mouth once daily.      vit C,F-Fo-kbvvk-lutein-zeaxan (Ocuvite Lutein and Zeaxanthin) 60 mg-13.5 mg- 15 mg-2 mg-6 mg capsule Take by mouth.       No current facility-administered medications on file prior to visit.              Objective   Visit Vitals  /60   Pulse 72   Temp 36.1 °C (96.9 °F)   Ht 1.702 m (5' 7\")   Wt 74.8 kg (165 lb)   SpO2 96%   BMI 25.84 kg/m²   Smoking Status Never   BSA 1.88 m²          6/21/2024    11:38 AM 7/31/2024     2:05 PM 9/25/2024    10:51 AM 10/10/2024     3:16 PM 11/18/2024    10:41 AM 1/7/2025    10:41 AM 2/5/2025 " "    1:09 PM   Vitals   Systolic 118 110 100 118 112 121 110   Diastolic 70 64 60 68 71 69 60   BP Location  Left arm Left arm Right arm  Right arm    Heart Rate 64 71 74 74 70 69 72   Temp 36.1 °C (97 °F) 35.9 °C (96.6 °F) 36.1 °C (97 °F) 36.3 °C (97.4 °F)  36 °C (96.8 °F) 36.1 °C (96.9 °F)   Resp 18  16 22 16 18    Height    1.702 m (5' 7\") 1.702 m (5' 7\")  1.702 m (5' 7\")   Weight (lb) 171  172 171 163 164 165   BMI 26.78 kg/m2  26.94 kg/m2 26.78 kg/m2 25.53 kg/m2 25.69 kg/m2 25.84 kg/m2   BSA (m2) 1.92 m2  1.92 m2 1.92 m2 1.87 m2 1.88 m2 1.88 m2   Visit Report Report Report Report Report Report Report Report     Physical Exam    Appears pale   Short of breat with minimal exertion.     Assessment & Plan  Urinary tract infection without hematuria, site unspecified  Will send urien for culture   Will check CBC and CMP   Wlil treat with omnicef   Alarming signs   Instructed pt to contact clinic is symptoms fail to improve or to return to clinic earlier if symptoms worsen   Counseled pt on warning signs of when to present to ER, they verbalized understanding.     Orders:    cefdinir (Omnicef) 300 mg capsule; Take 1 capsule (300 mg) by mouth 2 times a day for 7 days.    CBC and Auto Differential; Future    Comprehensive metabolic panel; Future    Urine Culture; Future    POCT UA (nonautomated) manually resulted    Altered mental status, unspecified altered mental status type  Could be related to UTI   He did not want to go to the hospital   Will treat for potnetial UTI   Will test eh labs below   Advsied on alarming signs , contact us if not getting better.     Orders:    CBC and Auto Differential; Future    Comprehensive metabolic panel; Future    Urine Culture; Future             "

## 2025-02-07 ENCOUNTER — TELEPHONE (OUTPATIENT)
Dept: PRIMARY CARE | Facility: CLINIC | Age: OVER 89
End: 2025-02-07
Payer: COMMERCIAL

## 2025-02-07 LAB
ALBUMIN SERPL-MCNC: 3.4 G/DL (ref 3.6–5.1)
ALP SERPL-CCNC: 107 U/L (ref 35–144)
ALT SERPL-CCNC: 18 U/L (ref 9–46)
ANION GAP SERPL CALCULATED.4IONS-SCNC: 9 MMOL/L (CALC) (ref 7–17)
AST SERPL-CCNC: 30 U/L (ref 10–35)
BACTERIA UR CULT: ABNORMAL
BASOPHILS # BLD AUTO: 16 CELLS/UL (ref 0–200)
BASOPHILS NFR BLD AUTO: 0.2 %
BILIRUB SERPL-MCNC: 1.1 MG/DL (ref 0.2–1.2)
BUN SERPL-MCNC: 37 MG/DL (ref 7–25)
CALCIUM SERPL-MCNC: 8.9 MG/DL (ref 8.6–10.3)
CHLORIDE SERPL-SCNC: 101 MMOL/L (ref 98–110)
CO2 SERPL-SCNC: 26 MMOL/L (ref 20–32)
CREAT SERPL-MCNC: 1.27 MG/DL (ref 0.7–1.22)
EGFRCR SERPLBLD CKD-EPI 2021: 51 ML/MIN/1.73M2
EOSINOPHIL # BLD AUTO: 49 CELLS/UL (ref 15–500)
EOSINOPHIL NFR BLD AUTO: 0.6 %
ERYTHROCYTE [DISTWIDTH] IN BLOOD BY AUTOMATED COUNT: 13.4 % (ref 11–15)
GLUCOSE SERPL-MCNC: 112 MG/DL (ref 65–99)
HCT VFR BLD AUTO: 40 % (ref 38.5–50)
HGB BLD-MCNC: 13.2 G/DL (ref 13.2–17.1)
LYMPHOCYTES # BLD AUTO: 2845 CELLS/UL (ref 850–3900)
LYMPHOCYTES NFR BLD AUTO: 34.7 %
MCH RBC QN AUTO: 30.7 PG (ref 27–33)
MCHC RBC AUTO-ENTMCNC: 33 G/DL (ref 32–36)
MCV RBC AUTO: 93 FL (ref 80–100)
MONOCYTES # BLD AUTO: 746 CELLS/UL (ref 200–950)
MONOCYTES NFR BLD AUTO: 9.1 %
NEUTROPHILS # BLD AUTO: 4543 CELLS/UL (ref 1500–7800)
NEUTROPHILS NFR BLD AUTO: 55.4 %
PLATELET # BLD AUTO: 189 THOUSAND/UL (ref 140–400)
PMV BLD REES-ECKER: 10.3 FL (ref 7.5–12.5)
POTASSIUM SERPL-SCNC: 4.3 MMOL/L (ref 3.5–5.3)
PROT SERPL-MCNC: 7 G/DL (ref 6.1–8.1)
RBC # BLD AUTO: 4.3 MILLION/UL (ref 4.2–5.8)
SODIUM SERPL-SCNC: 136 MMOL/L (ref 135–146)
WBC # BLD AUTO: 8.2 THOUSAND/UL (ref 3.8–10.8)

## 2025-02-10 ENCOUNTER — TELEPHONE (OUTPATIENT)
Dept: PRIMARY CARE | Facility: CLINIC | Age: OVER 89
End: 2025-02-10
Payer: COMMERCIAL

## 2025-02-10 DIAGNOSIS — N39.0 URINARY TRACT INFECTION WITHOUT HEMATURIA, SITE UNSPECIFIED: Primary | ICD-10-CM

## 2025-02-10 RX ORDER — NYSTATIN 100000 U/G
CREAM TOPICAL 2 TIMES DAILY
Qty: 30 G | Refills: 0 | Status: SHIPPED | OUTPATIENT
Start: 2025-02-10 | End: 2025-02-10 | Stop reason: SDUPTHER

## 2025-02-10 RX ORDER — AMOXICILLIN AND CLAVULANATE POTASSIUM 500; 125 MG/1; MG/1
500 TABLET, FILM COATED ORAL 2 TIMES DAILY
Qty: 20 TABLET | Refills: 0 | Status: SHIPPED | OUTPATIENT
Start: 2025-02-10 | End: 2025-02-20

## 2025-02-10 RX ORDER — NYSTATIN 100000 U/G
CREAM TOPICAL 2 TIMES DAILY
Qty: 30 G | Refills: 0 | Status: SHIPPED | OUTPATIENT
Start: 2025-02-10 | End: 2025-02-17

## 2025-02-10 RX ORDER — AMOXICILLIN AND CLAVULANATE POTASSIUM 500; 125 MG/1; MG/1
500 TABLET, FILM COATED ORAL 2 TIMES DAILY
Qty: 20 TABLET | Refills: 0 | Status: SHIPPED | OUTPATIENT
Start: 2025-02-10 | End: 2025-02-10 | Stop reason: SDUPTHER

## 2025-02-10 NOTE — TELEPHONE ENCOUNTER
----- Message from Jian Ramos sent at 2/10/2025  8:46 AM EST -----  Urine positive for infection , please ask if he is feelign better, if not , I will send him another course of a diferrent antibiotics.

## 2025-02-10 NOTE — TELEPHONE ENCOUNTER
Patient stated that he is feeling a letter better his urine is brown he stated and he would like another round of antibiotics sent in, patient also stated that he has a rash around the private area.

## 2025-02-13 NOTE — TELEPHONE ENCOUNTER
Patients daughter Karol is calling in she is stating that her dad has an allergy to the Augmentin that you sent in asking if you can send something else in.  She said that this is the second time that this has happened asking if you can please add this to his allergy list thank you. And let me know when you call something else in so I can advise the daughter Karol. Patient is stating that he has a rash on his shins and that you sent something in for that before asking if you can send that in again.

## 2025-02-14 NOTE — TELEPHONE ENCOUNTER
Spoke with pts daughter and she said he agreed to take the augmentin and has taken two doses and so far so good.

## 2025-02-18 ENCOUNTER — APPOINTMENT (OUTPATIENT)
Dept: PRIMARY CARE | Facility: CLINIC | Age: OVER 89
End: 2025-02-18
Payer: COMMERCIAL

## 2025-02-25 ENCOUNTER — OFFICE VISIT (OUTPATIENT)
Dept: PRIMARY CARE | Facility: CLINIC | Age: OVER 89
End: 2025-02-25
Payer: COMMERCIAL

## 2025-02-25 ENCOUNTER — TELEPHONE (OUTPATIENT)
Dept: HOME HEALTH SERVICES | Facility: HOME HEALTH | Age: OVER 89
End: 2025-02-25

## 2025-02-25 VITALS
SYSTOLIC BLOOD PRESSURE: 110 MMHG | WEIGHT: 160 LBS | DIASTOLIC BLOOD PRESSURE: 69 MMHG | BODY MASS INDEX: 25.06 KG/M2 | HEART RATE: 70 BPM | OXYGEN SATURATION: 98 %

## 2025-02-25 DIAGNOSIS — I50.9 CONGESTIVE HEART FAILURE, UNSPECIFIED HF CHRONICITY, UNSPECIFIED HEART FAILURE TYPE: ICD-10-CM

## 2025-02-25 DIAGNOSIS — L03.116 BILATERAL CELLULITIS OF LOWER LEG: Primary | ICD-10-CM

## 2025-02-25 DIAGNOSIS — L03.115 BILATERAL CELLULITIS OF LOWER LEG: Primary | ICD-10-CM

## 2025-02-25 PROCEDURE — 1159F MED LIST DOCD IN RCRD: CPT | Performed by: FAMILY MEDICINE

## 2025-02-25 PROCEDURE — 99214 OFFICE O/P EST MOD 30 MIN: CPT | Performed by: FAMILY MEDICINE

## 2025-02-25 PROCEDURE — G2211 COMPLEX E/M VISIT ADD ON: HCPCS | Performed by: FAMILY MEDICINE

## 2025-02-25 RX ORDER — MUPIROCIN 20 MG/G
OINTMENT TOPICAL 3 TIMES DAILY
Qty: 30 G | Refills: 2 | Status: SHIPPED | OUTPATIENT
Start: 2025-02-25 | End: 2025-03-07

## 2025-02-25 RX ORDER — DOXYCYCLINE 100 MG/1
100 CAPSULE ORAL 2 TIMES DAILY
Qty: 28 CAPSULE | Refills: 0 | Status: SHIPPED | OUTPATIENT
Start: 2025-02-25 | End: 2025-03-11

## 2025-02-25 NOTE — TELEPHONE ENCOUNTER
"Hello there,   Received [facility] referral for wound care. I see in OV note from today the orders as follows, \"mupirocin (Bactroban) 2 % ointment; Apply topically 3 times a day for 10 days. apply to affected area \"   I just wanted to verify if patient has a teachable caregiver that can assist with wound care?     Thank you  Intake department   "

## 2025-02-25 NOTE — LETTER
February 25, 2025    Aaron Nnamdi  [address]  [facility] [address]      Dear Mr. Coto:    Orders :   1) clotrimazole 1@ cream apply on the penis twice daily for 14 days     2) wound care of the bilateral Shins apply mupirocin ointment on the wounds bilatearlly , cover with none stick gauze and then wrap with wrapping material.     If you have any questions or concerns, please don't hesitate to call.    Sincerely,             Jian Ramos MD        CC: No Recipients

## 2025-02-25 NOTE — LETTER
February 25, 2025     Patient: Aaron Coto   YOB: 1925   Date of Visit: 2/25/2025       To Whom It May Concern:    It is my medical opinion that Mr. Coto requires a disability parking placard for the following reasons:  He cannot walk without assistance from another person or the use of an assistance device (cane, crutch, prosthetic device, wheelchair, etc.).  Duration of need: 2 years    If you have any questions or concerns, please don't hesitate to call.         Sincerely,        Jian Ramos MD        CC: No Recipients

## 2025-02-25 NOTE — PROGRESS NOTES
Subjective   Patient ID: Aaron Coto is a 89 y.o. male who presents for Rash and Wound Check.  HPI patient presenting with his daughter, he has bilateral leg erythema, laceration that he sustained at his bedroom, no recent falls, he is short of breath however denied being more short of breath than usual, no fever, no chills, he still sleeping in his bed    Review of Systems    Past Medical History:   Diagnosis Date    Chronic atrial fibrillation, unspecified (Multi)     Chronic atrial fibrillation    Chronic diastolic (congestive) heart failure 03/18/2021    Chronic diastolic congestive heart failure    Presence of cardiac pacemaker     Cardiac pacemaker       No past surgical history on file.   Social History     Socioeconomic History    Marital status:    Tobacco Use    Smoking status: Never    Smokeless tobacco: Never    Tobacco comments:     QUIT IN 1966   Vaping Use    Vaping status: Never Used   Substance and Sexual Activity    Alcohol use: Yes     Alcohol/week: 1.0 standard drink of alcohol     Types: 1 Glasses of wine per week    Drug use: Never     Social Drivers of Health     Financial Resource Strain: Low Risk  (9/26/2023)    Received from [facility]    Overall Financial Resource Strain (CARDIA)     Difficulty of Paying Living Expenses: Not hard at all   Food Insecurity: No Food Insecurity (9/26/2023)    Received from [facility]    Hunger Vital Sign     Worried About Running Out of Food in the Last Year: Never true     Ran Out of Food in the Last Year: Never true   Transportation Needs: No Transportation Needs (9/26/2023)    Received from [facility]    PRAPARE - Transportation     Lack of Transportation (Medical): No     Lack of Transportation (Non-Medical): No   Housing Stability: Low Risk  (9/26/2023)    Received from [facility]    Housing Stability Vital Sign     Unable to Pay for Housing in the  Last Year: No     Number of Places Lived in the Last Year: 1     Unstable Housing in the Last Year: No      Family History   Problem Relation Name Age of Onset    Liver cancer Father         MEDICATIONS AND ALLERGIES:    ALLERGIES Amoxicillin-pot clavulanate, Cat hair standardized allergenic extract, Sacubitril-valsartan, and Sulfa (sulfonamide antibiotics)    MEDICATIONS   Current Outpatient Medications on File Prior to Visit   Medication Sig Dispense Refill    [] amoxicillin-pot clavulanate (Augmentin) 500-125 mg tablet Take 1 tablet (500 mg) by mouth 2 times a day for 10 days. 20 tablet 0    ascorbic acid (Vitamin C) 500 mg tablet Take 1 tablet (500 mg) by mouth once daily.      calcium carb-mag oxide-vit D3 (Calcium Magnesium plus D) 400-167-133 mg-mg-unit tablet 1 tablet daily , ok to change as over the counter.      finasteride (Proscar) 5 mg tablet TAKE 1 TABLET DAILY 90 tablet 3    furosemide (Lasix) 40 mg tablet Take 1 tablet (40 mg) by mouth once daily.      levothyroxine (Synthroid, Levoxyl) 100 mcg tablet TAKE 1 TABLET DAILY 90 tablet 3    metoprolol succinate XL (Toprol-XL) 25 mg 24 hr tablet TAKE 1 TABLET DAILY 90 tablet 3    mv-min-folic-K1-lycopen-lutein (Centrum Silver Men) 035--300 mcg tablet 1 tablet daily      rivaroxaban (Xarelto) 15 mg tablet Take 1 tablet (15 mg) by mouth once daily.      spironolactone (Aldactone) 25 mg tablet Take 1 tablet (25 mg) by mouth once daily.      tamsulosin (Flomax) 0.4 mg 24 hr capsule Take 1 capsule (0.4 mg) by mouth once daily.      vit C,F-Xl-vbdoh-lutein-zeaxan (Ocuvite Lutein and Zeaxanthin) 60 mg-13.5 mg- 15 mg-2 mg-6 mg capsule Take by mouth.       No current facility-administered medications on file prior to visit.              Objective   Visit Vitals  /69 (BP Location: Right arm, Patient Position: Sitting, BP Cuff Size: Adult)   Pulse 70   Wt 72.6 kg (160 lb)   SpO2 98%   BMI 25.06 kg/m²   Smoking Status Never   BSA 1.85 m²           "7/31/2024     2:05 PM 9/25/2024    10:51 AM 10/10/2024     3:16 PM 11/18/2024    10:41 AM 1/7/2025    10:41 AM 2/5/2025     1:09 PM 2/25/2025    11:28 AM   Vitals   Systolic 110 100 118 112 121 110 110   Diastolic 64 60 68 71 69 60 69   BP Location Left arm Left arm Right arm  Right arm  Right arm   Heart Rate 71 74 74 70 69 72 70   Temp 35.9 °C (96.6 °F) 36.1 °C (97 °F) 36.3 °C (97.4 °F)  36 °C (96.8 °F) 36.1 °C (96.9 °F)    Resp  16 22 16 18     Height   1.702 m (5' 7\") 1.702 m (5' 7\")  1.702 m (5' 7\")    Weight (lb)  172 171 163 164 165 160   BMI  26.94 kg/m2 26.78 kg/m2 25.53 kg/m2 25.69 kg/m2 25.84 kg/m2 25.06 kg/m2   BSA (m2)  1.92 m2 1.92 m2 1.87 m2 1.88 m2 1.88 m2 1.85 m2   Visit Report Report Report Report Report Report Report Report     Physical Exam    Bilateral lower leg edema, improves with elevating the legs, bilateral laceration with fluid seeping    Assessment & Plan  Bilateral cellulitis of lower leg  With laceration of the bilateral legs, advised mupirocin ointment with nonstick gauze, will refer for home health skilled nursing for wound care, if the wound is not improving will refer the patient for wound management, we will treat with doxycycline 100 mg twice daily for 2 weeks, alarming signs discussed, contact us for any changes  Orders:    mupirocin (Bactroban) 2 % ointment; Apply topically 3 times a day for 10 days. apply to affected area    doxycycline (Vibramycin) 100 mg capsule; Take 1 capsule (100 mg) by mouth 2 times a day for 14 days. Take with at least 8 ounces (large glass) of water, do not lie down for 30 minutes after    Referral to Home Care; Future    Congestive heart failure, unspecified HF chronicity, unspecified heart failure type  Low ejection fraction 30%, bilateral leg swelling, shortness of breath, patient is 89 years old, advised to follow-up with cardiology, we introduced the philosophy of hospice, it might be helpful when he is ready, however he is not currently ready, he " would not mind going to the hospital if needed.

## 2025-02-26 ENCOUNTER — DOCUMENTATION (OUTPATIENT)
Dept: HOME HEALTH SERVICES | Facility: HOME HEALTH | Age: OVER 89
End: 2025-02-26
Payer: COMMERCIAL

## 2025-02-26 NOTE — HH CARE COORDINATION
Home Care received a Referral for Nursing. We have processed the referral for a Start of Care on 02/27-02/28.     If you have any questions or concerns, please feel free to contact us at [phone]. Follow the prompts, enter your five digit zip code, and you will be directed to your care team on [facility].

## 2025-02-27 ENCOUNTER — HOME CARE VISIT (OUTPATIENT)
Dept: HOME HEALTH SERVICES | Facility: HOME HEALTH | Age: OVER 89
End: 2025-02-27
Payer: COMMERCIAL

## 2025-02-27 VITALS
OXYGEN SATURATION: 99 % | HEART RATE: 72 BPM | RESPIRATION RATE: 16 BRPM | DIASTOLIC BLOOD PRESSURE: 60 MMHG | TEMPERATURE: 96.9 F | SYSTOLIC BLOOD PRESSURE: 99 MMHG

## 2025-02-27 PROCEDURE — G0299 HHS/HOSPICE OF RN EA 15 MIN: HCPCS | Mod: HHH

## 2025-02-27 PROCEDURE — 169592 NO-PAY CLAIM PROCEDURE

## 2025-02-27 RX ADMIN — MUPIROCIN 1 APPLICATION: 20 OINTMENT TOPICAL at 09:53

## 2025-02-27 ASSESSMENT — ENCOUNTER SYMPTOMS
SHORTNESS OF BREATH: 1
PERSON REPORTING PAIN: PATIENT
DYSPNEA ACTIVITY LEVEL: WHILE EATING
CONSTIPATION: 1
FATIGUES EASILY: 1
DYSPNEA ACTIVITY LEVEL: WHILE SPEAKING
DYSPNEA ACTIVITY LEVEL: AFTER AMBULATING LESS THAN 10 FT
STOOL FREQUENCY: DAILY
CHANGE IN APPETITE: UNCHANGED
LOWER EXTREMITY EDEMA: 1
DYSPNEA ON EXERTION: 1
DENIES PAIN: 1
APPETITE LEVEL: FAIR
LAST BOWEL MOVEMENT: 67261

## 2025-03-03 ENCOUNTER — HOME CARE VISIT (OUTPATIENT)
Dept: HOME HEALTH SERVICES | Facility: HOME HEALTH | Age: OVER 89
End: 2025-03-03
Payer: COMMERCIAL

## 2025-03-03 VITALS — TEMPERATURE: 97.3 F | RESPIRATION RATE: 20 BRPM | OXYGEN SATURATION: 90 % | HEART RATE: 73 BPM

## 2025-03-03 DIAGNOSIS — E03.9 HYPOTHYROIDISM, UNSPECIFIED TYPE: ICD-10-CM

## 2025-03-03 PROCEDURE — G0299 HHS/HOSPICE OF RN EA 15 MIN: HCPCS | Mod: HHH

## 2025-03-03 RX ORDER — LEVOTHYROXINE SODIUM 100 UG/1
100 TABLET ORAL DAILY
Qty: 90 TABLET | Refills: 3 | Status: SHIPPED | OUTPATIENT
Start: 2025-03-03

## 2025-03-03 ASSESSMENT — ENCOUNTER SYMPTOMS
PAIN: 1
APPETITE LEVEL: FAIR
SKIN LESIONS: 1
SUBJECTIVE PAIN PROGRESSION: WAXING AND WANING
PAIN LOCATION - PAIN SEVERITY: 3/10
MUSCLE WEAKNESS: 1
MUSCLE WEAKNESS: 1
SHORTNESS OF BREATH: 1
CHANGE IN APPETITE: UNCHANGED
LOWER EXTREMITY EDEMA: 1
SKIN LESIONS: 1
PERSON REPORTING PAIN: PATIENT
PAIN LOCATION: GENERALIZED
DYSPNEA ACTIVITY LEVEL: AT REST

## 2025-03-03 ASSESSMENT — ACTIVITIES OF DAILY LIVING (ADL)
ENTERING_EXITING_HOME: MAXIMUM ASSIST
OASIS_M1830: 05

## 2025-03-07 ENCOUNTER — HOME CARE VISIT (OUTPATIENT)
Dept: HOME HEALTH SERVICES | Facility: HOME HEALTH | Age: OVER 89
End: 2025-03-07
Payer: COMMERCIAL

## 2025-03-07 VITALS
SYSTOLIC BLOOD PRESSURE: 105 MMHG | RESPIRATION RATE: 22 BRPM | OXYGEN SATURATION: 92 % | DIASTOLIC BLOOD PRESSURE: 55 MMHG | TEMPERATURE: 97.3 F | HEART RATE: 85 BPM

## 2025-03-07 PROCEDURE — G0299 HHS/HOSPICE OF RN EA 15 MIN: HCPCS | Mod: HHH

## 2025-03-07 ASSESSMENT — ENCOUNTER SYMPTOMS
CHANGE IN APPETITE: UNCHANGED
DESCRIPTION OF MEMORY LOSS: SHORT TERM
TREMORS: 1
LAST BOWEL MOVEMENT: 67271
APPETITE LEVEL: POOR
DENIES PAIN: 1
SUBJECTIVE PAIN PROGRESSION: UNCHANGED
LOWEST PAIN SEVERITY IN PAST 24 HOURS: 0/10
STOOL FREQUENCY: LESS THAN DAILY
CONTUSION: 1
SHORTNESS OF BREATH: 1
DYSPNEA ACTIVITY LEVEL: AFTER AMBULATING LESS THAN 10 FT
PERSON REPORTING PAIN: PATIENT
FATIGUE: 1
HIGHEST PAIN SEVERITY IN PAST 24 HOURS: 0/10
BOWEL PATTERN NORMAL: 1
FORGETFULNESS: 1
LIMITED RANGE OF MOTION: 1
MUSCLE WEAKNESS: 1
OCCASIONAL FEELINGS OF UNSTEADINESS: 0
PAIN SEVERITY GOAL: 0/10

## 2025-03-07 ASSESSMENT — ACTIVITIES OF DAILY LIVING (ADL)
AMBULATION ASSISTANCE: ONE PERSON
CURRENT_FUNCTION: ONE PERSON

## 2025-03-10 ENCOUNTER — HOME CARE VISIT (OUTPATIENT)
Dept: HOME HEALTH SERVICES | Facility: HOME HEALTH | Age: OVER 89
End: 2025-03-10
Payer: COMMERCIAL

## 2025-03-10 ENCOUNTER — TELEPHONE (OUTPATIENT)
Dept: PRIMARY CARE | Facility: CLINIC | Age: OVER 89
End: 2025-03-10

## 2025-03-10 DIAGNOSIS — I50.9 CONGESTIVE HEART FAILURE, UNSPECIFIED HF CHRONICITY, UNSPECIFIED HEART FAILURE TYPE: Primary | ICD-10-CM

## 2025-03-10 DIAGNOSIS — N18.31 STAGE 3A CHRONIC KIDNEY DISEASE (MULTI): ICD-10-CM

## 2025-03-10 NOTE — TELEPHONE ENCOUNTER
ACCT: 0005 PAGE: 002 MS   IN: MMV  Mon 03/10 12:17PM EDT  DOCTOR:    DR GLOVER   CALLER:    ROYA MEJIA  DAUGHTER   PHONE#:    [ [phone]]   :       7/15/25   RE:        PT [facility]  [facility]OSPICE HAS REQUESTED              ORDERS TO BEGIN HOSPICE CARE CAN YOU FAX THEM  #              IW  [phone]

## 2025-03-11 ASSESSMENT — ACTIVITIES OF DAILY LIVING (ADL)
HOME_HEALTH_OASIS: 01
OASIS_M1830: 04

## 2025-03-11 NOTE — TELEPHONE ENCOUNTER
Spoke to Karol - she said they are all set and declined appt.  She said to Thank you for paving the way and convincing Dad to do Hospice.   Also, said she dropped of paper for a lift recliner on Friday  and asked that she be called when it is ready.

## 2025-04-07 ENCOUNTER — APPOINTMENT (OUTPATIENT)
Dept: PRIMARY CARE | Facility: CLINIC | Age: OVER 89
End: 2025-04-07
Payer: COMMERCIAL

## 2025-04-17 ENCOUNTER — TELEPHONE (OUTPATIENT)
Dept: PRIMARY CARE | Facility: CLINIC | Age: OVER 89
End: 2025-04-17
Payer: COMMERCIAL

## 2025-04-17 NOTE — TELEPHONE ENCOUNTER
----- Message from Jian Ramos sent at 4/17/2025  2:22 PM EDT -----  Urine culture showing infection , did he get antibiotics ?  ----- Message -----  From: Jorge Aquino MA  Sent: 4/16/2025   8:18 AM EDT  To: Jian Ramos MD

## 2025-06-04 ENCOUNTER — TELEPHONE (OUTPATIENT)
Dept: PRIMARY CARE | Facility: CLINIC | Age: OVER 89
End: 2025-06-04
Payer: COMMERCIAL

## 2025-06-04 DIAGNOSIS — I50.9 CONGESTIVE HEART FAILURE, UNSPECIFIED HF CHRONICITY, UNSPECIFIED HEART FAILURE TYPE: Primary | ICD-10-CM

## 2025-06-04 DIAGNOSIS — R29.898 WEAKNESS OF BOTH LOWER EXTREMITIES: ICD-10-CM

## 2025-06-04 DIAGNOSIS — R29.898 WEAKNESS OF BOTH UPPER EXTREMITIES: ICD-10-CM

## 2025-06-04 NOTE — TELEPHONE ENCOUNTER
Farida, therapy at [facility] calling asking for an O.T. order for his right shoulder pain.       P: [phone]  F:[phone]